# Patient Record
Sex: MALE | Race: WHITE | NOT HISPANIC OR LATINO | Employment: OTHER | URBAN - METROPOLITAN AREA
[De-identification: names, ages, dates, MRNs, and addresses within clinical notes are randomized per-mention and may not be internally consistent; named-entity substitution may affect disease eponyms.]

---

## 2023-03-28 LAB
BASOPHILS (10*3/UL) IN BLOOD BY AUTOMATED COUNT: 0.05 X10E9/L (ref 0–0.1)
BASOPHILS/100 LEUKOCYTES IN BLOOD BY AUTOMATED COUNT: 0.7 % (ref 0–2)
CALCIDIOL (25 OH VITAMIN D3) (NG/ML) IN SER/PLAS: 46 NG/ML
EOSINOPHILS (10*3/UL) IN BLOOD BY AUTOMATED COUNT: 0.53 X10E9/L (ref 0–0.4)
EOSINOPHILS/100 LEUKOCYTES IN BLOOD BY AUTOMATED COUNT: 7.4 % (ref 0–6)
ERYTHROCYTE DISTRIBUTION WIDTH (RATIO) BY AUTOMATED COUNT: 14.9 % (ref 11.5–14.5)
ERYTHROCYTE MEAN CORPUSCULAR HEMOGLOBIN CONCENTRATION (G/DL) BY AUTOMATED: 30.4 G/DL (ref 32–36)
ERYTHROCYTE MEAN CORPUSCULAR VOLUME (FL) BY AUTOMATED COUNT: 91 FL (ref 80–100)
ERYTHROCYTES (10*6/UL) IN BLOOD BY AUTOMATED COUNT: 4.28 X10E12/L (ref 4.5–5.9)
HEMATOCRIT (%) IN BLOOD BY AUTOMATED COUNT: 39.1 % (ref 41–52)
HEMOGLOBIN (G/DL) IN BLOOD: 11.9 G/DL (ref 13.5–17.5)
IMMATURE GRANULOCYTES/100 LEUKOCYTES IN BLOOD BY AUTOMATED COUNT: 0.3 % (ref 0–0.9)
LEUKOCYTES (10*3/UL) IN BLOOD BY AUTOMATED COUNT: 7.2 X10E9/L (ref 4.4–11.3)
LYMPHOCYTES (10*3/UL) IN BLOOD BY AUTOMATED COUNT: 1.26 X10E9/L (ref 0.8–3)
LYMPHOCYTES/100 LEUKOCYTES IN BLOOD BY AUTOMATED COUNT: 17.5 % (ref 13–44)
MONOCYTES (10*3/UL) IN BLOOD BY AUTOMATED COUNT: 0.62 X10E9/L (ref 0.05–0.8)
MONOCYTES/100 LEUKOCYTES IN BLOOD BY AUTOMATED COUNT: 8.6 % (ref 2–10)
NEUTROPHILS (10*3/UL) IN BLOOD BY AUTOMATED COUNT: 4.73 X10E9/L (ref 1.6–5.5)
NEUTROPHILS/100 LEUKOCYTES IN BLOOD BY AUTOMATED COUNT: 65.5 % (ref 40–80)
NRBC (PER 100 WBCS) BY AUTOMATED COUNT: 0 /100 WBC (ref 0–0)
PLATELETS (10*3/UL) IN BLOOD AUTOMATED COUNT: 145 X10E9/L (ref 150–450)

## 2023-05-09 ENCOUNTER — NURSING HOME VISIT (OUTPATIENT)
Dept: POST ACUTE CARE | Facility: EXTERNAL LOCATION | Age: 88
End: 2023-05-09
Payer: MEDICARE

## 2023-05-09 DIAGNOSIS — S32.591A PUBIC RAMUS FRACTURE, RIGHT, CLOSED, INITIAL ENCOUNTER (MULTI): Primary | ICD-10-CM

## 2023-05-09 DIAGNOSIS — J44.89 COPD WITH ASTHMA (MULTI): ICD-10-CM

## 2023-05-09 DIAGNOSIS — S22.43XA CLOSED FRACTURE OF MULTIPLE RIBS OF BOTH SIDES, INITIAL ENCOUNTER: ICD-10-CM

## 2023-05-09 PROCEDURE — 99310 SBSQ NF CARE HIGH MDM 45: CPT | Performed by: FAMILY MEDICINE

## 2023-05-09 PROCEDURE — G0317 PROLONG NURSING FAC EVAL 15M: HCPCS | Performed by: FAMILY MEDICINE

## 2023-05-09 NOTE — LETTER
Patient: Tomasz Menjivar  : 1/15/1934    Encounter Date: 2023    Nursing Home Visit  Name: Tomasz Menjivar  YOB: 1934  MRN: [id number]    Chief Complaint    Subjective  HPI:   72 y/o male Patient tripped over a grocery bag at home, falling on his R side. Imaging at [facility] ED notable for right pneumothorax as well as a posterior rib fx and pubic rami fx. Patient initially requiring O2 to 2L but resolved. Ortho c/s and recommended nonsurgical interventions and WBAT to the RLE. Patient admitted to [facility] where pain was well controlled on PO meds. Patient eating/drinking w/o issues. Patient pulling 1.5L on IS. PT/OT evaluated patient and recommended SNF. He was discharged in stable condition with appropriate f/u and scripts.   Review of Systems:  Reviewed chart looking at current medications, treatment, labs and x-rays and note pertinent positives or negatives, otherwise 10 point system review negative except for what is noted in HPI.    Objective  VS: 159/85, 98.0, 87, 18, 96%, 128.8#    Physical exam:   Physical Exam       Assessment/Plan   No problem-specific Assessment & Plan notes found for this encounter.       Nursing Home Visit  Name: Tomasz Menjivar  YOB: 1934  MRN: [id number]    Chief Complaint  Follow up on new admission  Subjective  HPI:   72 y/o Patient tripped over a grocery bag at home, falling on his R side. Imaging at [facility] ED notable for right pneumothorax as well as a posterior rib fx and pubic rami fx. Patient initially requiring O2 to 2L but resolved. Ortho c/s and recommended nonsurgical interventions and WBAT to the RLE. Patient admitted to [facility] where pain was well controlled on PO meds. Patient eating/drinking w/o issues. PT/OT evaluated patient and recommended SNF. He was discharged in stable condition with appropriate f/u and scripts.    Patient sitting in room.  Appetite good, denies pain or discomfort.    Review of Systems:  Reviewed chart looking at current  medications, treatment, labs and x-rays and note pertinent positives or negatives, otherwise 10 point system review negative except for what is noted in HPI.    Objective  VS: 159/85, 98.0, 87, 18, 96%, 129.8#    Physical exam:   Physical Exam  Vitals and nursing note reviewed.   Constitutional:       Appearance: Normal appearance. He is normal weight.   HENT:      Head: Normocephalic.      Nose: Nose normal.      Mouth/Throat:      Mouth: Mucous membranes are moist.   Eyes:      Extraocular Movements: Extraocular movements intact.      Conjunctiva/sclera: Conjunctivae normal.   Cardiovascular:      Rate and Rhythm: Normal rate and regular rhythm.      Pulses: Normal pulses.      Heart sounds: Normal heart sounds.   Pulmonary:      Effort: Pulmonary effort is normal.      Breath sounds: Normal breath sounds.   Abdominal:      General: Bowel sounds are normal.      Palpations: Abdomen is soft.   Musculoskeletal:         General: Normal range of motion.      Comments: Ecchymotic area right flank, painful to touch.     Skin:     General: Skin is warm and dry.   Neurological:      General: No focal deficit present.      Mental Status: He is alert and oriented to person, place, and time.   Psychiatric:         Mood and Affect: Mood normal.         Behavior: Behavior normal.         Thought Content: Thought content normal.         Judgment: Judgment normal.        Assessment/Plan   72 y/o Patient tripped over a grocery bag at home, falling on his R side. Imaging at [facility] ED notable for right pneumothorax as well as a posterior rib fx and pubic rami fx.  # Pubic Ramus fracture- WBAT follow up with Dr. Hernandez in 1-2 weeks.  . For PT/OT for mobility, gait training, endurance, safety awareness, ADLs, and assessment of post-rehab needs.   # Pain- Patient is refusing oxy and states he only wants Tylenol and lidocaine patch for pain. He says his pain is minimal  # COPD- c/w fluticasone, prn albuterol  # HTN- c/w losartan,  hydrochlorothiazide  # Insomnia- c/w melatonin  # Glaucoma- c/w Latanoprost, Brimonidine        Electronically Signed By: BRITANY Nunez-CNP   5/22/23  3:44 PM

## 2023-05-10 ENCOUNTER — NURSING HOME VISIT (OUTPATIENT)
Dept: POST ACUTE CARE | Facility: EXTERNAL LOCATION | Age: 88
End: 2023-05-10
Payer: MEDICARE

## 2023-05-10 DIAGNOSIS — S32.591A PUBIC RAMUS FRACTURE, RIGHT, CLOSED, INITIAL ENCOUNTER (MULTI): ICD-10-CM
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
## 2023-05-10 DIAGNOSIS — S22.43XA CLOSED FRACTURE OF MULTIPLE RIBS OF BOTH SIDES, INITIAL ENCOUNTER: ICD-10-CM

## 2023-05-10 DIAGNOSIS — J44.89 COPD WITH ASTHMA (MULTI): ICD-10-CM

## 2023-05-10 PROCEDURE — 99306 1ST NF CARE HIGH MDM 50: CPT | Performed by: FAMILY MEDICINE

## 2023-05-10 NOTE — LETTER
Patient: Tomasz Menjivar  : 1/15/1934    Encounter Date: 05/10/2023    Subjective  Patient ID: Tomasz Menjivar is a 89 y.o. male who presents for No chief complaint on file..      HPI  73 YOM transferred as trauma from OSH. Patient tripped over a grocery bag at   home, falling on his R side. Imaging at [facility] ED notable for right pneumothorax   as well as a posterior rib fx and pubic rami fx. Patient initially requiring O2   to 2L but resolved.  Ortho c/s and recommended nonsurgical interventions and   WBAT to the RLE.  Patient admitted to [facility] where pain was well controlled on PO   meds.  Patient eating/drinking w/o issues. Patient pulling 1.5L on IS. PT/OT   evaluated patient and recommended SNF.     Admitted to [facility] for rehab  C/o pain back of ribs.   No current outpatient medications on file prior to visit.     No current facility-administered medications on file prior to visit.        Review of Systems   Constitutional:  Positive for activity change, appetite change and fatigue.   HENT: Negative.     Respiratory:  Positive for shortness of breath.    Cardiovascular: Negative.    Gastrointestinal:  Positive for constipation.   Genitourinary: Negative.    Musculoskeletal:  Positive for arthralgias, back pain, gait problem and myalgias. Negative for joint swelling.   Hematological: Negative.    Psychiatric/Behavioral: Negative.             Objective  There were no vitals taken for this visit.  BSA: There is no height or weight on file to calculate BSA.  Growth percentiles: Facility age limit for growth %chad is 20 years. Facility age limit for growth %chad is 20 years.   No visits with results within 1 Week(s) from this visit.   Latest known visit with results is:   Orders Only on 2023   Component Date Value Ref Range Status   • WBC 2023 7.2  4.4 - 11.3 x10E9/L Final   • nRBC 2023 0.0  0.0 - 0.0 /100 WBC Final   • RBC 2023 4.28 (L)  4.50 - 5.90 x10E12/L Final   • Hemoglobin  03/28/2023 11.9 (L)  13.5 - 17.5 g/dL Final   • Hematocrit 03/28/2023 39.1 (L)  41.0 - 52.0 % Final   • MCV 03/28/2023 91  80 - 100 fL Final   • MCHC 03/28/2023 30.4 (L)  32.0 - 36.0 g/dL Final   • Platelets 03/28/2023 145 (L)  150 - 450 x10E9/L Final   • RDW 03/28/2023 14.9 (H)  11.5 - 14.5 % Final   • Neutrophils % 03/28/2023 65.5  40.0 - 80.0 % Final   • Immature Granulocytes %, Automated 03/28/2023 0.3  0.0 - 0.9 % Final    Comment:  Immature Granulocyte Count (IG) includes promyelocytes,    myelocytes and metamyelocytes but does not include bands.   Percent differential counts (%) should be interpreted in the   context of the absolute cell counts (cells/L).     • Lymphocytes % 03/28/2023 17.5  13.0 - 44.0 % Final   • Monocytes % 03/28/2023 8.6  2.0 - 10.0 % Final   • Eosinophils % 03/28/2023 7.4  0.0 - 6.0 % Final   • Basophils % 03/28/2023 0.7  0.0 - 2.0 % Final   • Neutrophils Absolute 03/28/2023 4.73  1.60 - 5.50 x10E9/L Final   • Lymphocytes Absolute 03/28/2023 1.26  0.80 - 3.00 x10E9/L Final   • Monocytes Absolute 03/28/2023 0.62  0.05 - 0.80 x10E9/L Final   • Eosinophils Absolute 03/28/2023 0.53 (H)  0.00 - 0.40 x10E9/L Final   • Basophils Absolute 03/28/2023 0.05  0.00 - 0.10 x10E9/L Final      Physical Exam  Constitutional:       Appearance: Normal appearance.   HENT:      Head: Normocephalic and atraumatic.      Nose: Nose normal.   Eyes:      Pupils: Pupils are equal, round, and reactive to light.   Cardiovascular:      Rate and Rhythm: Normal rate and regular rhythm.   Pulmonary:      Effort: Pulmonary effort is normal.      Breath sounds: Normal breath sounds.   Chest:      Chest wall: Tenderness present.   Abdominal:      General: Abdomen is flat.   Musculoskeletal:         General: Swelling and tenderness present.      Cervical back: Normal range of motion.   Neurological:      General: No focal deficit present.      Mental Status: He is alert and oriented to person, place, and time.    Psychiatric:         Mood and Affect: Mood normal.     Assessment and Plan:      Code Status:       Code Status Full Code             Assessment:      72YO M PMHx of HTN, HLD, allergies and asthma presented after fall in grocery      store parking lot             List of clinically significant injuries/problems:      - R hptx      - R rib fx's 5-8, (6-8 segmental)      - R pubic rami fx      - PMHx asthma, HTN, HLD             Plan/Recommendations:               ## R rib fx's       - Initially requiring O2; now on RA       - encourage frequent IS use - pulling 1500cc       - multimodal pain control with Robaxin, lidocaine patches, scheduled      acetaminophen, as needed oxy2.5/5             ## R pubic rami fx      Pain control          -> no acute surgical intervention           -> WBAT bilateral lower extremities           -> follow up outpt with Dr. Hernandez 1-2 weeks      - PT/OT: SNF             ## HTN      - Continue home Losartan, hctz,     # COPD   C/w Advair.                 Assessment/Plan  Problem List Items Addressed This Visit       Pubic ramus fracture, right, closed, initial encounter (CMS/[facility])          -> no acute surgical intervention           -> WBAT bilateral lower extremities           -> follow up outpt with Dr. Hernandez 1-2 weeks      - PT/OT: SNF                    Rib fracture    COPD with asthma (CMS/[facility])    Fall - Primary     PLAN:Reviewed orders, medications, records, and pertinent labs/x-rays from   hospital. Monitor VS, BS, O2, etc as per protocol. See written orders. PT/OT   will evaluate and start appropriate rehabilitation program. Reviewed and signed   off orders, medications,Labs, x-rays, and current diagnoses. Reviewed and   updated CPR status and any changes in Advanced directives. Continue Rehab Will   see 1-2 times weekly for next 30 days then reassess. Will always see at   resident, family , or nursing request. Discharge Planning   Time   Time Spent With Patient: 55 minutes  of which greater than 50 percent was spent   counseling and or coordinating care.             Electronically Signed By: James Krueger MD   5/14/23  1:52 PM

## 2023-05-11 ENCOUNTER — NURSING HOME VISIT (OUTPATIENT)
Dept: POST ACUTE CARE | Facility: EXTERNAL LOCATION | Age: 88
End: 2023-05-11

## 2023-05-11 ENCOUNTER — NURSING HOME VISIT (OUTPATIENT)
Dept: POST ACUTE CARE | Facility: EXTERNAL LOCATION | Age: 88
End: 2023-05-11
Payer: MEDICARE

## 2023-05-11 DIAGNOSIS — J44.89 COPD WITH ASTHMA (MULTI): ICD-10-CM

## 2023-05-11 DIAGNOSIS — S32.591A PUBIC RAMUS FRACTURE, RIGHT, CLOSED, INITIAL ENCOUNTER (MULTI): Primary | ICD-10-CM

## 2023-05-11 DIAGNOSIS — R52 PAIN: ICD-10-CM

## 2023-05-11 DIAGNOSIS — G47.09 OTHER INSOMNIA: ICD-10-CM

## 2023-05-11 DIAGNOSIS — H40.89 OTHER GLAUCOMA OF BOTH EYES: ICD-10-CM

## 2023-05-11 DIAGNOSIS — S22.43XA CLOSED FRACTURE OF MULTIPLE RIBS OF BOTH SIDES, INITIAL ENCOUNTER: ICD-10-CM

## 2023-05-11 DIAGNOSIS — I10 PRIMARY HYPERTENSION: ICD-10-CM

## 2023-05-11 PROCEDURE — 99308 SBSQ NF CARE LOW MDM 20: CPT | Performed by: FAMILY MEDICINE

## 2023-05-11 PROCEDURE — 99309 SBSQ NF CARE MODERATE MDM 30: CPT | Performed by: FAMILY MEDICINE

## 2023-05-11 NOTE — LETTER
Patient: Tomasz Menjivar  : 1/15/1934    Encounter Date: 2023    Nursing Home Visit  Name: Tomasz Menjivar  YOB: 1934  MRN: [id number]    Chief Complaint  Follow up on new admission  Subjective  HPI:   74 y/o Patient tripped over a grocery bag at home, falling on his R side. Imaging at [facility] ED notable for right pneumothorax as well as a posterior rib fx and pubic rami fx. Patient initially requiring O2 to 2L but resolved. Ortho c/s and recommended nonsurgical interventions and WBAT to the RLE. Patient admitted to [facility] where pain was well controlled on PO meds. Patient eating/drinking w/o issues. PT/OT evaluated patient and recommended SNF. He was discharged in stable condition with appropriate f/u and scripts.    Patient sitting in room.  Appetite good, denies pain or discomfort.      Patient in room.  Feels good, pain is minimal.  He states he believes he should be discharged soon  Review of Systems:  Reviewed chart looking at current medications, treatment, labs and x-rays and note pertinent positives or negatives, otherwise 10 point system review negative except for what is noted in HPI.    Objective  VS: 140/72, 97.8, 72, 18, 96%,     Physical exam:   Physical Exam  Vitals and nursing note reviewed.   Constitutional:       Appearance: Normal appearance. He is normal weight.   HENT:      Head: Normocephalic.      Nose: Nose normal.      Mouth/Throat:      Mouth: Mucous membranes are moist.   Eyes:      Extraocular Movements: Extraocular movements intact.      Conjunctiva/sclera: Conjunctivae normal.   Cardiovascular:      Rate and Rhythm: Normal rate and regular rhythm.      Pulses: Normal pulses.      Heart sounds: Normal heart sounds.   Pulmonary:      Effort: Pulmonary effort is normal.      Breath sounds: Normal breath sounds.   Abdominal:      General: Bowel sounds are normal.      Palpations: Abdomen is soft.   Musculoskeletal:         General: Normal range of motion.       Comments: Ecchymotic area right flank, less pain.  Ambulating with walker w/o issue   Skin:     General: Skin is warm and dry.   Neurological:      General: No focal deficit present.      Mental Status: He is alert and oriented to person, place, and time.   Psychiatric:         Mood and Affect: Mood normal.         Behavior: Behavior normal.         Thought Content: Thought content normal.         Judgment: Judgment normal.        Assessment/Plan   74 y/o Patient tripped over a grocery bag at home, falling on his R side. Imaging at [facility] ED notable for right pneumothorax as well as a posterior rib fx and pubic rami fx.    Pubic ramus fracture, right, closed, initial encounter (CMS/[facility])  C/w PT/OT for mobility, gait training, endurance, safety awareness, ADLs, and assessment of post-rehab needs.      Pain  C/w Lidocaine patch and prn Tylenol.  Is stable     COPD with asthma (CMS/[facility])  c/w fluticasone, prn albuterol     Primary hypertension  C/w losartan, hydrochlorothiazide     Other insomnia  C/w melatonin     Other specified glaucoma  c/w Latanoprost, Brimonidine     Rib fracture  C/w PT/OT for mobility, gait training, endurance, safety awareness, ADLs, and assessment of post-rehab needs.         Electronically Signed By: ZAIAR Nunez   6/29/23  4:39 PM

## 2023-05-11 NOTE — LETTER
Patient: Tomasz Menjivar  : 1/15/1934    Encounter Date: 2023    Nursing Home Visit  Name: Tomasz Menjivar  YOB: 1934  MRN: [id number]    Chief Complaint  Follow up on new admission  Subjective  HPI:   74 y/o Patient tripped over a grocery bag at home, falling on his R side. Imaging at [facility] ED notable for right pneumothorax as well as a posterior rib fx and pubic rami fx. Patient initially requiring O2 to 2L but resolved. Ortho c/s and recommended nonsurgical interventions and WBAT to the RLE. Patient admitted to [facility] where pain was well controlled on PO meds. Patient eating/drinking w/o issues. PT/OT evaluated patient and recommended SNF. He was discharged in stable condition with appropriate f/u and scripts.    Patient sitting in room.  Appetite good, denies pain or discomfort.      Patient in room.  Feels good, pain is minimal.  He states he believes he should be discharged soon  Review of Systems:  Reviewed chart looking at current medications, treatment, labs and x-rays and note pertinent positives or negatives, otherwise 10 point system review negative except for what is noted in HPI.    Objective  VS: 140/72, 97.8, 72, 18, 96%,     Physical exam:   Physical Exam  Vitals and nursing note reviewed.   Constitutional:       Appearance: Normal appearance. He is normal weight.   HENT:      Head: Normocephalic.      Nose: Nose normal.      Mouth/Throat:      Mouth: Mucous membranes are moist.   Eyes:      Extraocular Movements: Extraocular movements intact.      Conjunctiva/sclera: Conjunctivae normal.   Cardiovascular:      Rate and Rhythm: Normal rate and regular rhythm.      Pulses: Normal pulses.      Heart sounds: Normal heart sounds.   Pulmonary:      Effort: Pulmonary effort is normal.      Breath sounds: Normal breath sounds.   Abdominal:      General: Bowel sounds are normal.      Palpations: Abdomen is soft.   Musculoskeletal:         General: Normal range of motion.       Comments: Ecchymotic area right flank, less pain.  Ambulating with walker w/o issue   Skin:     General: Skin is warm and dry.   Neurological:      General: No focal deficit present.      Mental Status: He is alert and oriented to person, place, and time.   Psychiatric:         Mood and Affect: Mood normal.         Behavior: Behavior normal.         Thought Content: Thought content normal.         Judgment: Judgment normal.        Assessment/Plan   72 y/o Patient tripped over a grocery bag at home, falling on his R side. Imaging at [facility] ED notable for right pneumothorax as well as a posterior rib fx and pubic rami fx.  Pubic ramus fracture, right, closed, initial encounter (CMS/[facility])  C/w PT/OT for mobility, gait training, endurance, safety awareness, ADLs, and assessment of post-rehab needs.     Pain  C/w Lidocaine patch and prn Tylenol.  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        Electronically Signed By: ZAIRA Nunez   5/22/23  4:18 PM

## 2023-05-14 VITALS — SYSTOLIC BLOOD PRESSURE: 112 MMHG | HEART RATE: 80 BPM | DIASTOLIC BLOOD PRESSURE: 80 MMHG

## 2023-05-14 PROBLEM — S32.591A PUBIC RAMUS FRACTURE, RIGHT, CLOSED, INITIAL ENCOUNTER (MULTI): Status: ACTIVE | Noted: 2023-05-14

## 2023-05-14 PROBLEM — J44.89 COPD WITH ASTHMA (MULTI): Status: ACTIVE | Noted: 2023-05-14
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
## 2023-05-14 PROBLEM — S22.39XA RIB FRACTURE: Status: ACTIVE | Noted: 2023-05-14

## 2023-05-14 ASSESSMENT — ENCOUNTER SYMPTOMS
MYALGIAS: 1
FATIGUE: 1
SHORTNESS OF BREATH: 1
APPETITE CHANGE: 1
BACK PAIN: 1
HEMATOLOGIC/LYMPHATIC NEGATIVE: 1
PSYCHIATRIC NEGATIVE: 1
CARDIOVASCULAR NEGATIVE: 1
CONSTIPATION: 1
ARTHRALGIAS: 1
ACTIVITY CHANGE: 1
JOINT SWELLING: 0

## 2023-05-14 NOTE — PROGRESS NOTES
Subjective   Patient ID: Tomasz Menjivar is a 89 y.o. male who presents for No chief complaint on file..      HPI  73 YOM transferred as trauma from OSH. Patient tripped over a grocery bag at   home, falling on his R side. Imaging at [facility] ED notable for right pneumothorax   as well as a posterior rib fx and pubic rami fx. Patient initially requiring O2   to 2L but resolved.  Ortho c/s and recommended nonsurgical interventions and   WBAT to the RLE.  Patient admitted to [facility] where pain was well controlled on PO   meds.  Patient eating/drinking w/o issues. Patient pulling 1.5L on IS. PT/OT   evaluated patient and recommended SNF.     Admitted to [facility] for rehab  C/o pain back of ribs.   No current outpatient medications on file prior to visit.     No current facility-administered medications on file prior to visit.        Review of Systems   Constitutional:  Positive for activity change, appetite change and fatigue.   HENT: Negative.     Respiratory:  Positive for shortness of breath.    Cardiovascular: Negative.    Gastrointestinal:  Positive for constipation.   Genitourinary: Negative.    Musculoskeletal:  Positive for arthralgias, back pain, gait problem and myalgias. Negative for joint swelling.   Hematological: Negative.    Psychiatric/Behavioral: Negative.             Objective   There were no vitals taken for this visit.  BSA: There is no height or weight on file to calculate BSA.  Growth percentiles: Facility age limit for growth %chad is 20 years. Facility age limit for growth %chad is 20 years.   No visits with results within 1 Week(s) from this visit.   Latest known visit with results is:   Orders Only on 03/28/2023   Component Date Value Ref Range Status    WBC 03/28/2023 7.2  4.4 - 11.3 x10E9/L Final    nRBC 03/28/2023 0.0  0.0 - 0.0 /100 WBC Final    RBC 03/28/2023 4.28 (L)  4.50 - 5.90 x10E12/L Final    Hemoglobin 03/28/2023 11.9 (L)  13.5 - 17.5 g/dL Final    Hematocrit 03/28/2023 39.1 (L)  41.0  - 52.0 % Final    MCV 03/28/2023 91  80 - 100 fL Final    MCHC 03/28/2023 30.4 (L)  32.0 - 36.0 g/dL Final    Platelets 03/28/2023 145 (L)  150 - 450 x10E9/L Final    RDW 03/28/2023 14.9 (H)  11.5 - 14.5 % Final    Neutrophils % 03/28/2023 65.5  40.0 - 80.0 % Final    Immature Granulocytes %, Automated 03/28/2023 0.3  0.0 - 0.9 % Final    Comment:  Immature Granulocyte Count (IG) includes promyelocytes,    myelocytes and metamyelocytes but does not include bands.   Percent differential counts (%) should be interpreted in the   context of the absolute cell counts (cells/L).      Lymphocytes % 03/28/2023 17.5  13.0 - 44.0 % Final    Monocytes % 03/28/2023 8.6  2.0 - 10.0 % Final    Eosinophils % 03/28/2023 7.4  0.0 - 6.0 % Final    Basophils % 03/28/2023 0.7  0.0 - 2.0 % Final    Neutrophils Absolute 03/28/2023 4.73  1.60 - 5.50 x10E9/L Final    Lymphocytes Absolute 03/28/2023 1.26  0.80 - 3.00 x10E9/L Final    Monocytes Absolute 03/28/2023 0.62  0.05 - 0.80 x10E9/L Final    Eosinophils Absolute 03/28/2023 0.53 (H)  0.00 - 0.40 x10E9/L Final    Basophils Absolute 03/28/2023 0.05  0.00 - 0.10 x10E9/L Final      Physical Exam  Constitutional:       Appearance: Normal appearance.   HENT:      Head: Normocephalic and atraumatic.      Nose: Nose normal.   Eyes:      Pupils: Pupils are equal, round, and reactive to light.   Cardiovascular:      Rate and Rhythm: Normal rate and regular rhythm.   Pulmonary:      Effort: Pulmonary effort is normal.      Breath sounds: Normal breath sounds.   Chest:      Chest wall: Tenderness present.   Abdominal:      General: Abdomen is flat.   Musculoskeletal:         General: Swelling and tenderness present.      Cervical back: Normal range of motion.   Neurological:      General: No focal deficit present.      Mental Status: He is alert and oriented to person, place, and time.   Psychiatric:         Mood and Affect: Mood normal.     Assessment and Plan:      Code Status:       Code  Status Full Code             Assessment:      74YO M PMHx of HTN, HLD, allergies and asthma presented after fall in grocery      store parking lot             List of clinically significant injuries/problems:      - R hptx      - R rib fx's 5-8, (6-8 segmental)      - R pubic rami fx      - PMHx asthma, HTN, HLD             Plan/Recommendations:               ## R rib fx's       - Initially requiring O2; now on RA       - encourage frequent IS use - pulling 1500cc       - multimodal pain control with Robaxin, lidocaine patches, scheduled      acetaminophen, as needed oxy2.5/5             ## R pubic rami fx      Pain control          -> no acute surgical intervention           -> WBAT bilateral lower extremities           -> follow up outpt with Dr. Hernandez 1-2 weeks      - PT/OT: [facility]             ## HTN      - Continue home Losartan, hctz,     # COPD   C/w Advair.                 Assessment/Plan   Problem List Items Addressed This Visit       Pubic ramus fracture, right, closed, initial encounter (CMS/[facility])          -> no acute surgical intervention           -> WBAT bilateral lower extremities           -> follow up outpt with Dr. Hernandez 1-2 weeks      - PT/OT: [facility]                    Rib fracture    COPD with asthma (CMS/[facility])    Fall - Primary     PLAN:Reviewed orders, medications, records, and pertinent labs/x-rays from   hospital. Monitor VS, BS, O2, etc as per protocol. See written orders. PT/OT   will evaluate and start appropriate rehabilitation program. Reviewed and signed   off orders, medications,Labs, x-rays, and current diagnoses. Reviewed and   updated CPR status and any changes in Advanced directives. Continue Rehab Will   see 1-2 times weekly for next 30 days then reassess. Will always see at   resident, family , or nursing request. Discharge Planning   Time   Time Spent With Patient: 55 minutes of which greater than 50 percent was spent   counseling and or coordinating care.

## 2023-05-14 NOTE — ASSESSMENT & PLAN NOTE
-> no acute surgical intervention           -> WBAT bilateral lower extremities           -> follow up outpt with Dr. Hernandez 1-2 weeks      - PT/OT: SNF

## 2023-05-16 ENCOUNTER — NURSING HOME VISIT (OUTPATIENT)
Dept: POST ACUTE CARE | Facility: EXTERNAL LOCATION | Age: 88
End: 2023-05-16
Payer: MEDICARE

## 2023-05-16 DIAGNOSIS — R52 PAIN: ICD-10-CM

## 2023-05-16 DIAGNOSIS — H40.89 OTHER GLAUCOMA OF BOTH EYES: ICD-10-CM

## 2023-05-16 DIAGNOSIS — I10 PRIMARY HYPERTENSION: ICD-10-CM

## 2023-05-16 DIAGNOSIS — J44.89 COPD WITH ASTHMA (MULTI): ICD-10-CM

## 2023-05-16 DIAGNOSIS — G47.09 OTHER INSOMNIA: ICD-10-CM

## 2023-05-16 DIAGNOSIS — S32.591A PUBIC RAMUS FRACTURE, RIGHT, CLOSED, INITIAL ENCOUNTER (MULTI): Primary | ICD-10-CM

## 2023-05-16 DIAGNOSIS — S22.43XA CLOSED FRACTURE OF MULTIPLE RIBS OF BOTH SIDES, INITIAL ENCOUNTER: ICD-10-CM

## 2023-05-16 PROCEDURE — 99309 SBSQ NF CARE MODERATE MDM 30: CPT | Performed by: FAMILY MEDICINE

## 2023-05-16 NOTE — LETTER
Patient: Tomasz Menjivar  : 1/15/1934    Encounter Date: 2023    Nursing Home Visit  Name: Tomasz Menjivar  YOB: 1934  MRN: [id number]    Chief Complaint  Plan for discharge for patient  Subjective  HPI:   72 y/o Patient tripped over a grocery bag at home, falling on his R side. Imaging at [facility] ED notable for right pneumothorax as well as a posterior rib fx and pubic rami fx. Patient initially requiring O2 to 2L but resolved. Ortho c/s and recommended nonsurgical interventions and WBAT to the RLE. Patient admitted to [facility] where pain was well controlled on PO meds. Patient eating/drinking w/o issues. PT/OT evaluated patient and recommended SNF. He was discharged in stable condition with appropriate f/u and scripts.    Patient sitting in room.  Appetite good, denies pain or discomfort.      Patient in room.  Feels good, pain is minimal.  He states he believes he should be discharged soon    Patient in good spirits.  Appetite good, denies pain or discomfort. Denies HA, dizziness, SOB, CP, N&V or constipation    Review of Systems:  Reviewed chart looking at current medications, treatment, labs and x-rays and note pertinent positives or negatives, otherwise 10 point system review negative except for what is noted in HPI.    Objective  VS: 140/72, 97.8, 72, 18, 96%,     Physical exam:   Physical Exam  Vitals and nursing note reviewed.   Constitutional:       Appearance: Normal appearance. He is normal weight.   HENT:      Head: Normocephalic.      Nose: Nose normal.      Mouth/Throat:      Mouth: Mucous membranes are moist.   Eyes:      Extraocular Movements: Extraocular movements intact.      Conjunctiva/sclera: Conjunctivae normal.   Cardiovascular:      Rate and Rhythm: Normal rate and regular rhythm.      Pulses: Normal pulses.      Heart sounds: Normal heart sounds.   Pulmonary:      Effort: Pulmonary effort is normal.      Breath sounds: Normal breath sounds.   Abdominal:       General: Bowel sounds are normal.      Palpations: Abdomen is soft.   Musculoskeletal:         General: Normal range of motion.      Comments: Ecchymotic area right flank, less pain.  Ambulating with walker w/o issue   Skin:     General: Skin is warm and dry.   Neurological:      General: No focal deficit present.      Mental Status: He is alert and oriented to person, place, and time.   Psychiatric:         Mood and Affect: Mood normal.         Behavior: Behavior normal.         Thought Content: Thought content normal.         Judgment: Judgment normal.        Assessment/Plan   74 y/o Patient tripped over a grocery bag at home, falling on his R side. Imaging at [facility] ED notable for right pneumothorax as well as a posterior rib fx and pubic rami fx.    Other insomnia  C/w melatonin    Pain  C/w Lidocaine patch and prn Tylenol.  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     Rib fracture  C/w PT/OT for mobility, gait training, endurance, safety awareness, ADLs, and assessment of post-rehab needs.      Other specified glaucoma  c/w Latanoprost, Brimonidine    Primary hypertension  C/w losartan, hydrochlorothiazide      Client requires two wheeled walker d/t mobility limitation that significantly impairs his ability to participate in one or more mobility-related activities of daily living in the home including toileting, dressing, grooming, and bathing. The client is able to safely use the walker and the functional mobility deficit can be sufficiently resolved with use of walker.        Electronically Signed By: ZAIRA Nunez   5/23/23  1:28 PM

## 2023-05-18 ENCOUNTER — NURSING HOME VISIT (OUTPATIENT)
Dept: POST ACUTE CARE | Facility: EXTERNAL LOCATION | Age: 88
End: 2023-05-18
Payer: MEDICARE

## 2023-05-18 DIAGNOSIS — G47.09 OTHER INSOMNIA: ICD-10-CM

## 2023-05-18 DIAGNOSIS — S32.591S: Primary | ICD-10-CM

## 2023-05-18 DIAGNOSIS — I10 PRIMARY HYPERTENSION: ICD-10-CM

## 2023-05-18 DIAGNOSIS — R52 PAIN: ICD-10-CM

## 2023-05-18 DIAGNOSIS — J44.89 COPD WITH ASTHMA (MULTI): ICD-10-CM

## 2023-05-18 DIAGNOSIS — H40.89 OTHER GLAUCOMA OF BOTH EYES: ICD-10-CM

## 2023-05-18 DIAGNOSIS — S22.43XA CLOSED FRACTURE OF MULTIPLE RIBS OF BOTH SIDES, INITIAL ENCOUNTER: ICD-10-CM

## 2023-05-18 PROCEDURE — 99316 NF DSCHRG MGMT 30 MIN+: CPT | Performed by: FAMILY MEDICINE

## 2023-05-18 NOTE — LETTER
Patient: Tomasz Menjivar  : 1/15/1934    Encounter Date: 2023    Nursing Home Visit  Name: Tomasz Menjivar  YOB: 1934  MRN: [id number]    Chief Complaint  Plan for discharge for patient  Subjective  HPI:   72 y/o Patient tripped over a grocery bag at home, falling on his R side. Imaging at [facility] ED notable for right pneumothorax as well as a posterior rib fx and pubic rami fx. Patient initially requiring O2 to 2L but resolved. Ortho c/s and recommended nonsurgical interventions and WBAT to the RLE. Patient admitted to [facility] where pain was well controlled on PO meds. Patient eating/drinking w/o issues. PT/OT evaluated patient and recommended SNF. He was discharged in stable condition with appropriate f/u and scripts.    Patient sitting in room.  Appetite good, denies pain or discomfort.      Patient in room.  Feels good, pain is minimal.  He states he believes he should be discharged soon    Patient in good spirits.  Appetite good, denies pain or discomfort. Denies HA, dizziness, SOB, CP, N&V or constipation    Patient planned for discharge next week.  He is in good spirits, denies pain or discomfort    Review of Systems:  Reviewed chart looking at current medications, treatment, labs and x-rays and note pertinent positives or negatives, otherwise 10 point system review negative except for what is noted in HPI.    Objective  VS:      Physical exam:   Physical Exam  Vitals and nursing note reviewed.   Constitutional:       Appearance: Normal appearance. He is normal weight.   HENT:      Head: Normocephalic.      Nose: Nose normal.      Mouth/Throat:      Mouth: Mucous membranes are moist.   Eyes:      Extraocular Movements: Extraocular movements intact.      Conjunctiva/sclera: Conjunctivae normal.   Cardiovascular:      Rate and Rhythm: Normal rate and regular rhythm.      Pulses: Normal pulses.      Heart sounds: Normal heart sounds.   Pulmonary:      Effort: Pulmonary effort is  normal.      Breath sounds: Normal breath sounds.   Abdominal:      General: Bowel sounds are normal.      Palpations: Abdomen is soft.   Musculoskeletal:         General: Normal range of motion.      Comments: Ecchymotic area right flank, absorbing, minimal pain  Ambulating with walker w/o issue   Skin:     General: Skin is warm and dry.   Neurological:      General: No focal deficit present.      Mental Status: He is alert and oriented to person, place, and time.   Psychiatric:         Mood and Affect: Mood normal.         Behavior: Behavior normal.         Thought Content: Thought content normal.         Judgment: Judgment normal.        Assessment/Plan   72 y/o Patient tripped over a grocery bag at home, falling on his R side. Imaging at [facility] ED notable for right pneumothorax as well as a posterior rib fx and pubic rami fx.     Other insomnia  C/w melatonin     Pain  C/w Lidocaine patch and prn Tylenol.  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      Rib fracture  C/w PT/OT for mobility, gait training, endurance, safety awareness, ADLs, and assessment of post-rehab needs.       Other specified glaucoma  c/w Latanoprost, Brimonidine     Primary hypertension  C/w losartan, hydrochlorothiazide    Client requires two wheeled walker d/t mobility limitation that significantly impairs his ability to participate in one or more mobility-related activities of daily living in the home including toileting, dressing, grooming, and bathing. The client is able to safely use the walker and the functional mobility deficit can be sufficiently resolved with use of walker.    Discussed with patient, nsg, and .  Patient ready for home scripts written and given to patient      Electronically Signed By: Cintia GAN  BRITANY Strong-CNP   7/8/23 12:36 PM

## 2023-05-22 PROBLEM — H40.89 OTHER SPECIFIED GLAUCOMA: Status: ACTIVE | Noted: 2023-05-22

## 2023-05-22 PROBLEM — I10 PRIMARY HYPERTENSION: Status: ACTIVE | Noted: 2023-05-22

## 2023-05-22 PROBLEM — G47.09 OTHER INSOMNIA: Status: ACTIVE | Noted: 2023-05-22

## 2023-05-22 PROBLEM — R52 PAIN: Status: ACTIVE | Noted: 2023-05-22

## 2023-05-22 NOTE — PROGRESS NOTES
Nursing Home Visit  Name: Tomasz Menjivar  YOB: 1934  MRN: [id number]    Chief Complaint  Follow up on new admission  Subjective  HPI:   72 y/o Patient tripped over a grocery bag at home, falling on his R side. Imaging at [facility] ED notable for right pneumothorax as well as a posterior rib fx and pubic rami fx. Patient initially requiring O2 to 2L but resolved. Ortho c/s and recommended nonsurgical interventions and WBAT to the RLE. Patient admitted to [facility] where pain was well controlled on PO meds. Patient eating/drinking w/o issues. PT/OT evaluated patient and recommended SNF. He was discharged in stable condition with appropriate f/u and scripts.   5/9 Patient sitting in room.  Appetite good, denies pain or discomfort.    Review of Systems:  Reviewed chart looking at current medications, treatment, labs and x-rays and note pertinent positives or negatives, otherwise 10 point system review negative except for what is noted in HPI.    Objective  VS: 159/85, 98.0, 87, 18, 96%, 129.8#    Physical exam:   Physical Exam  Vitals and nursing note reviewed.   Constitutional:       Appearance: Normal appearance. He is normal weight.   HENT:      Head: Normocephalic.      Nose: Nose normal.      Mouth/Throat:      Mouth: Mucous membranes are moist.   Eyes:      Extraocular Movements: Extraocular movements intact.      Conjunctiva/sclera: Conjunctivae normal.   Cardiovascular:      Rate and Rhythm: Normal rate and regular rhythm.      Pulses: Normal pulses.      Heart sounds: Normal heart sounds.   Pulmonary:      Effort: Pulmonary effort is normal.      Breath sounds: Normal breath sounds.   Abdominal:      General: Bowel sounds are normal.      Palpations: Abdomen is soft.   Musculoskeletal:         General: Normal range of motion.      Comments: Ecchymotic area right flank, painful to touch.     Skin:     General: Skin is warm and dry.   Neurological:      General: No focal deficit present.      Mental Status:  He is alert and oriented to person, place, and time.   Psychiatric:         Mood and Affect: Mood normal.         Behavior: Behavior normal.         Thought Content: Thought content normal.         Judgment: Judgment normal.        Assessment/Plan   74 y/o Patient tripped over a grocery bag at home, falling on his R side. Imaging at [facility] ED notable for right pneumothorax as well as a posterior rib fx and pubic rami fx.  # Pubic Ramus fracture- WBAT follow up with Dr. Hernandez in 1-2 weeks.  . For PT/OT for mobility, gait training, endurance, safety awareness, ADLs, and assessment of post-rehab needs.   # Pain- Patient is refusing oxy and states he only wants Tylenol and lidocaine patch for pain. He says his pain is minimal  # COPD- c/w fluticasone, prn albuterol  # HTN- c/w losartan, hydrochlorothiazide  # Insomnia- c/w melatonin  # Glaucoma- c/w Latanoprost, Brimonidine

## 2023-05-22 NOTE — PROGRESS NOTES
Nursing Home Visit  Name: Tomasz Menjivar  YOB: 1934  MRN: [id number]    Chief Complaint  Follow up on new admission  Subjective  HPI:   72 y/o Patient tripped over a grocery bag at home, falling on his R side. Imaging at [facility] ED notable for right pneumothorax as well as a posterior rib fx and pubic rami fx. Patient initially requiring O2 to 2L but resolved. Ortho c/s and recommended nonsurgical interventions and WBAT to the RLE. Patient admitted to [facility] where pain was well controlled on PO meds. Patient eating/drinking w/o issues. PT/OT evaluated patient and recommended SNF. He was discharged in stable condition with appropriate f/u and scripts.   5/9 Patient sitting in room.  Appetite good, denies pain or discomfort.    5/11  Patient in room.  Feels good, pain is minimal.  He states he believes he should be discharged soon  Review of Systems:  Reviewed chart looking at current medications, treatment, labs and x-rays and note pertinent positives or negatives, otherwise 10 point system review negative except for what is noted in HPI.    Objective  VS: 140/72, 97.8, 72, 18, 96%,     Physical exam:   Physical Exam  Vitals and nursing note reviewed.   Constitutional:       Appearance: Normal appearance. He is normal weight.   HENT:      Head: Normocephalic.      Nose: Nose normal.      Mouth/Throat:      Mouth: Mucous membranes are moist.   Eyes:      Extraocular Movements: Extraocular movements intact.      Conjunctiva/sclera: Conjunctivae normal.   Cardiovascular:      Rate and Rhythm: Normal rate and regular rhythm.      Pulses: Normal pulses.      Heart sounds: Normal heart sounds.   Pulmonary:      Effort: Pulmonary effort is normal.      Breath sounds: Normal breath sounds.   Abdominal:      General: Bowel sounds are normal.      Palpations: Abdomen is soft.   Musculoskeletal:         General: Normal range of motion.      Comments: Ecchymotic area right flank, less pain.  Ambulating with walker w/o  issue   Skin:     General: Skin is warm and dry.   Neurological:      General: No focal deficit present.      Mental Status: He is alert and oriented to person, place, and time.   Psychiatric:         Mood and Affect: Mood normal.         Behavior: Behavior normal.         Thought Content: Thought content normal.         Judgment: Judgment normal.        Assessment/Plan   74 y/o Patient tripped over a grocery bag at home, falling on his R side. Imaging at [facility] ED notable for right pneumothorax as well as a posterior rib fx and pubic rami fx.  Pubic ramus fracture, right, closed, initial encounter (CMS/[facility])  C/w PT/OT for mobility, gait training, endurance, safety awareness, ADLs, and assessment of post-rehab needs.     Pain  C/w Lidocaine patch and prn Tylenol.  Is stable    COPD with asthma (CMS/[facility])  c/w fluticasone, prn albuterol    Primary hypertension  C/w losartan, hydrochlorothiazide    Other insomnia  C/w melatonin    Other specified glaucoma  c/w Latanoprost, Brimonidine    Rib fracture  C/w PT/OT for mobility, gait training, endurance, safety awareness, ADLs, and assessment of post-rehab needs.

## 2023-05-22 NOTE — ASSESSMENT & PLAN NOTE
C/w PT/OT for mobility, gait training, endurance, safety awareness, ADLs, and assessment of post-rehab needs.

## 2023-05-22 NOTE — PROGRESS NOTES
Nursing Home Visit  Name: Tomasz Menjivar  YOB: 1934  MRN: [id number]    Chief Complaint    Subjective  HPI:   74 y/o male Patient tripped over a grocery bag at home, falling on his R side. Imaging at [facility] ED notable for right pneumothorax as well as a posterior rib fx and pubic rami fx. Patient initially requiring O2 to 2L but resolved. Ortho c/s and recommended nonsurgical interventions and WBAT to the RLE. Patient admitted to [facility] where pain was well controlled on PO meds. Patient eating/drinking w/o issues. Patient pulling 1.5L on IS. PT/OT evaluated patient and recommended SNF. He was discharged in stable condition with appropriate f/u and scripts.   Review of Systems:  Reviewed chart looking at current medications, treatment, labs and x-rays and note pertinent positives or negatives, otherwise 10 point system review negative except for what is noted in HPI.    Objective  VS: 159/85, 98.0, 87, 18, 96%, 128.8#    Physical exam:   Physical Exam       Assessment/Plan   No problem-specific Assessment & Plan notes found for this encounter.

## 2023-05-23 VITALS
HEART RATE: 68 BPM | OXYGEN SATURATION: 97 % | WEIGHT: 129.8 LBS | TEMPERATURE: 97.9 F | SYSTOLIC BLOOD PRESSURE: 140 MMHG | RESPIRATION RATE: 18 BRPM | DIASTOLIC BLOOD PRESSURE: 81 MMHG

## 2023-05-23 RX ORDER — LOSARTAN POTASSIUM 100 MG/1
100 TABLET ORAL DAILY
COMMUNITY
Start: 2021-06-04

## 2023-05-23 RX ORDER — AZELASTINE 1 MG/ML
2 SPRAY, METERED NASAL 2 TIMES DAILY
COMMUNITY
Start: 2022-06-23

## 2023-05-23 RX ORDER — FLUTICASONE PROPIONATE 50 MCG
1 SPRAY, SUSPENSION (ML) NASAL DAILY
COMMUNITY
Start: 2013-10-11

## 2023-05-23 RX ORDER — ACETAMINOPHEN 325 MG/1
650 TABLET ORAL EVERY 6 HOURS PRN
Qty: 30 TABLET | Refills: 0
Start: 2023-05-23 | End: 2023-06-02

## 2023-05-23 RX ORDER — BRIMONIDINE TARTRATE 2 MG/ML
1 SOLUTION/ DROPS OPHTHALMIC 2 TIMES DAILY
COMMUNITY
End: 2023-11-01 | Stop reason: SDUPTHER

## 2023-05-23 RX ORDER — FLUTICASONE PROPIONATE AND SALMETEROL 100; 50 UG/1; UG/1
1 POWDER RESPIRATORY (INHALATION)
COMMUNITY
Start: 2013-10-11

## 2023-05-23 RX ORDER — MELATONIN 3 MG
3 CAPSULE ORAL NIGHTLY
Refills: 0
Start: 2023-05-16

## 2023-05-23 RX ORDER — LATANOPROST 50 UG/ML
1 SOLUTION/ DROPS OPHTHALMIC NIGHTLY
COMMUNITY
Start: 2013-10-11

## 2023-05-23 RX ORDER — ALBUTEROL SULFATE 90 UG/1
2 AEROSOL, METERED RESPIRATORY (INHALATION) EVERY 6 HOURS PRN
COMMUNITY
Start: 2022-06-23

## 2023-05-23 RX ORDER — HYDROCHLOROTHIAZIDE 25 MG/1
25 TABLET ORAL DAILY
COMMUNITY
Start: 2021-06-04

## 2023-05-23 RX ORDER — LIDOCAINE 50 MG/G
1 PATCH TOPICAL DAILY
Start: 2023-05-29

## 2023-05-23 NOTE — PROGRESS NOTES
Nursing Home Visit  Name: Tomasz Menjivar  YOB: 1934  MRN: [id number]    Chief Complaint  Plan for discharge for patient  Subjective  HPI:   74 y/o Patient tripped over a grocery bag at home, falling on his R side. Imaging at [facility] ED notable for right pneumothorax as well as a posterior rib fx and pubic rami fx. Patient initially requiring O2 to 2L but resolved. Ortho c/s and recommended nonsurgical interventions and WBAT to the RLE. Patient admitted to [facility] where pain was well controlled on PO meds. Patient eating/drinking w/o issues. PT/OT evaluated patient and recommended SNF. He was discharged in stable condition with appropriate f/u and scripts.   5/9 Patient sitting in room.  Appetite good, denies pain or discomfort.    5/11  Patient in room.  Feels good, pain is minimal.  He states he believes he should be discharged soon  5/16  Patient in good spirits.  Appetite good, denies pain or discomfort. Denies HA, dizziness, SOB, CP, N&V or constipation    Review of Systems:  Reviewed chart looking at current medications, treatment, labs and x-rays and note pertinent positives or negatives, otherwise 10 point system review negative except for what is noted in HPI.    Objective  VS: 140/72, 97.8, 72, 18, 96%,     Physical exam:   Physical Exam  Vitals and nursing note reviewed.   Constitutional:       Appearance: Normal appearance. He is normal weight.   HENT:      Head: Normocephalic.      Nose: Nose normal.      Mouth/Throat:      Mouth: Mucous membranes are moist.   Eyes:      Extraocular Movements: Extraocular movements intact.      Conjunctiva/sclera: Conjunctivae normal.   Cardiovascular:      Rate and Rhythm: Normal rate and regular rhythm.      Pulses: Normal pulses.      Heart sounds: Normal heart sounds.   Pulmonary:      Effort: Pulmonary effort is normal.      Breath sounds: Normal breath sounds.   Abdominal:      General: Bowel sounds are normal.      Palpations: Abdomen is soft.    Musculoskeletal:         General: Normal range of motion.      Comments: Ecchymotic area right flank, less pain.  Ambulating with walker w/o issue   Skin:     General: Skin is warm and dry.   Neurological:      General: No focal deficit present.      Mental Status: He is alert and oriented to person, place, and time.   Psychiatric:         Mood and Affect: Mood normal.         Behavior: Behavior normal.         Thought Content: Thought content normal.         Judgment: Judgment normal.        Assessment/Plan   72 y/o Patient tripped over a grocery bag at home, falling on his R side. Imaging at [facility] ED notable for right pneumothorax as well as a posterior rib fx and pubic rami fx.    Other insomnia  C/w melatonin    Pain  C/w Lidocaine patch and prn Tylenol.  Is stable    COPD with asthma (CMS/[facility])  c/w fluticasone, prn albuterol    Pubic ramus fracture, right, closed, initial encounter (CMS/[facility])  C/w PT/OT for mobility, gait training, endurance, safety awareness, ADLs, and assessment of post-rehab needs.     Rib fracture  C/w PT/OT for mobility, gait training, endurance, safety awareness, ADLs, and assessment of post-rehab needs.      Other specified glaucoma  c/w Latanoprost, Brimonidine    Primary hypertension  C/w losartan, hydrochlorothiazide      Client requires two wheeled walker d/t mobility limitation that significantly impairs his ability to participate in one or more mobility-related activities of daily living in the home including toileting, dressing, grooming, and bathing. The client is able to safely use the walker and the functional mobility deficit can be sufficiently resolved with use of walker.

## 2023-06-29 NOTE — PROGRESS NOTES
Nursing Home Visit  Name: Tomasz Menjivar  YOB: 1934  MRN: [id number]    Chief Complaint  Follow up on new admission  Subjective  HPI:   74 y/o Patient tripped over a grocery bag at home, falling on his R side. Imaging at [facility] ED notable for right pneumothorax as well as a posterior rib fx and pubic rami fx. Patient initially requiring O2 to 2L but resolved. Ortho c/s and recommended nonsurgical interventions and WBAT to the RLE. Patient admitted to [facility] where pain was well controlled on PO meds. Patient eating/drinking w/o issues. PT/OT evaluated patient and recommended SNF. He was discharged in stable condition with appropriate f/u and scripts.   5/9 Patient sitting in room.  Appetite good, denies pain or discomfort.    5/11  Patient in room.  Feels good, pain is minimal.  He states he believes he should be discharged soon  Review of Systems:  Reviewed chart looking at current medications, treatment, labs and x-rays and note pertinent positives or negatives, otherwise 10 point system review negative except for what is noted in HPI.    Objective  VS: 140/72, 97.8, 72, 18, 96%,     Physical exam:   Physical Exam  Vitals and nursing note reviewed.   Constitutional:       Appearance: Normal appearance. He is normal weight.   HENT:      Head: Normocephalic.      Nose: Nose normal.      Mouth/Throat:      Mouth: Mucous membranes are moist.   Eyes:      Extraocular Movements: Extraocular movements intact.      Conjunctiva/sclera: Conjunctivae normal.   Cardiovascular:      Rate and Rhythm: Normal rate and regular rhythm.      Pulses: Normal pulses.      Heart sounds: Normal heart sounds.   Pulmonary:      Effort: Pulmonary effort is normal.      Breath sounds: Normal breath sounds.   Abdominal:      General: Bowel sounds are normal.      Palpations: Abdomen is soft.   Musculoskeletal:         General: Normal range of motion.      Comments: Ecchymotic area right flank, less pain.  Ambulating with walker w/o  issue   Skin:     General: Skin is warm and dry.   Neurological:      General: No focal deficit present.      Mental Status: He is alert and oriented to person, place, and time.   Psychiatric:         Mood and Affect: Mood normal.         Behavior: Behavior normal.         Thought Content: Thought content normal.         Judgment: Judgment normal.        Assessment/Plan   72 y/o Patient tripped over a grocery bag at home, falling on his R side. Imaging at [facility] ED notable for right pneumothorax as well as a posterior rib fx and pubic rami fx.    Pubic ramus fracture, right, closed, initial encounter (CMS/[facility])  C/w PT/OT for mobility, gait training, endurance, safety awareness, ADLs, and assessment of post-rehab needs.      Pain  C/w Lidocaine patch and prn Tylenol.  Is stable     COPD with asthma (CMS/[facility])  c/w fluticasone, prn albuterol     Primary hypertension  C/w losartan, hydrochlorothiazide     Other insomnia  C/w melatonin     Other specified glaucoma  c/w Latanoprost, Brimonidine     Rib fracture  C/w PT/OT for mobility, gait training, endurance, safety awareness, ADLs, and assessment of post-rehab needs.

## 2023-07-08 VITALS
HEART RATE: 72 BPM | OXYGEN SATURATION: 96 % | RESPIRATION RATE: 18 BRPM | DIASTOLIC BLOOD PRESSURE: 59 MMHG | SYSTOLIC BLOOD PRESSURE: 145 MMHG | TEMPERATURE: 98 F | WEIGHT: 131 LBS

## 2023-07-08 PROBLEM — S32.591S: Status: ACTIVE | Noted: 2023-05-14

## 2023-07-08 PROBLEM — S32.592S: Status: RESOLVED | Noted: 2023-07-08 | Resolved: 2023-07-08

## 2023-07-08 PROBLEM — S32.592S: Status: ACTIVE | Noted: 2023-07-08

## 2023-07-08 NOTE — PROGRESS NOTES
Nursing Home Visit  Name: Tomasz Menjivar  YOB: 1934  MRN: [id number]    Chief Complaint  Plan for discharge for patient  Subjective  HPI:   74 y/o Patient tripped over a grocery bag at home, falling on his R side. Imaging at [facility] ED notable for right pneumothorax as well as a posterior rib fx and pubic rami fx. Patient initially requiring O2 to 2L but resolved. Ortho c/s and recommended nonsurgical interventions and WBAT to the RLE. Patient admitted to [facility] where pain was well controlled on PO meds. Patient eating/drinking w/o issues. PT/OT evaluated patient and recommended SNF. He was discharged in stable condition with appropriate f/u and scripts.   5/9 Patient sitting in room.  Appetite good, denies pain or discomfort.    5/11  Patient in room.  Feels good, pain is minimal.  He states he believes he should be discharged soon  5/16  Patient in good spirits.  Appetite good, denies pain or discomfort. Denies HA, dizziness, SOB, CP, N&V or constipation  5/18  Patient planned for discharge next week.  He is in good spirits, denies pain or discomfort    Review of Systems:  Reviewed chart looking at current medications, treatment, labs and x-rays and note pertinent positives or negatives, otherwise 10 point system review negative except for what is noted in HPI.    Objective  VS:      Physical exam:   Physical Exam  Vitals and nursing note reviewed.   Constitutional:       Appearance: Normal appearance. He is normal weight.   HENT:      Head: Normocephalic.      Nose: Nose normal.      Mouth/Throat:      Mouth: Mucous membranes are moist.   Eyes:      Extraocular Movements: Extraocular movements intact.      Conjunctiva/sclera: Conjunctivae normal.   Cardiovascular:      Rate and Rhythm: Normal rate and regular rhythm.      Pulses: Normal pulses.      Heart sounds: Normal heart sounds.   Pulmonary:      Effort: Pulmonary effort is normal.      Breath sounds: Normal breath sounds.   Abdominal:       General: Bowel sounds are normal.      Palpations: Abdomen is soft.   Musculoskeletal:         General: Normal range of motion.      Comments: Ecchymotic area right flank, absorbing, minimal pain  Ambulating with walker w/o issue   Skin:     General: Skin is warm and dry.   Neurological:      General: No focal deficit present.      Mental Status: He is alert and oriented to person, place, and time.   Psychiatric:         Mood and Affect: Mood normal.         Behavior: Behavior normal.         Thought Content: Thought content normal.         Judgment: Judgment normal.        Assessment/Plan   74 y/o Patient tripped over a grocery bag at home, falling on his R side. Imaging at [facility] ED notable for right pneumothorax as well as a posterior rib fx and pubic rami fx.     Other insomnia  C/w melatonin     Pain  C/w Lidocaine patch and prn Tylenol.  Is stable     COPD with asthma (CMS/[facility])  c/w fluticasone, prn albuterol     Pubic ramus fracture, right, closed, initial encounter (CMS/[facility])  C/w PT/OT for mobility, gait training, endurance, safety awareness, ADLs, and assessment of post-rehab needs.      Rib fracture  C/w PT/OT for mobility, gait training, endurance, safety awareness, ADLs, and assessment of post-rehab needs.       Other specified glaucoma  c/w Latanoprost, Brimonidine     Primary hypertension  C/w losartan, hydrochlorothiazide    Client requires two wheeled walker d/t mobility limitation that significantly impairs his ability to participate in one or more mobility-related activities of daily living in the home including toileting, dressing, grooming, and bathing. The client is able to safely use the walker and the functional mobility deficit can be sufficiently resolved with use of walker.    Discussed with patient, nsg, and .  Patient ready for home scripts written and given to patient

## 2023-10-31 DIAGNOSIS — H40.1132 PRIMARY OPEN ANGLE GLAUCOMA OF BOTH EYES, MODERATE STAGE: Primary | ICD-10-CM

## 2023-11-01 DIAGNOSIS — H40.9 GLAUCOMA OF BOTH EYES, UNSPECIFIED GLAUCOMA TYPE: Primary | ICD-10-CM

## 2023-11-01 RX ORDER — BRIMONIDINE TARTRATE 2 MG/ML
SOLUTION/ DROPS OPHTHALMIC
Qty: 10 ML | Refills: 0 | Status: SHIPPED | OUTPATIENT
Start: 2023-11-01 | End: 2024-04-20

## 2023-11-01 RX ORDER — BRIMONIDINE TARTRATE 2 MG/ML
1 SOLUTION/ DROPS OPHTHALMIC 2 TIMES DAILY
Qty: 3 ML | Refills: 11 | Status: SHIPPED | OUTPATIENT
Start: 2023-11-01 | End: 2023-12-07 | Stop reason: SDUPTHER

## 2023-11-16 ENCOUNTER — OFFICE VISIT (OUTPATIENT)
Dept: OPHTHALMOLOGY | Facility: CLINIC | Age: 88
End: 2023-11-16
Payer: MEDICARE

## 2023-11-16 DIAGNOSIS — H25.011 CORTICAL AGE-RELATED CATARACT OF RIGHT EYE: ICD-10-CM

## 2023-11-16 DIAGNOSIS — H40.1111 PRIMARY OPEN ANGLE GLAUCOMA OF RIGHT EYE, MILD STAGE: ICD-10-CM

## 2023-11-16 DIAGNOSIS — H26.9 CATARACT OF BOTH EYES, UNSPECIFIED CATARACT TYPE: Primary | ICD-10-CM

## 2023-11-16 LAB
A LENGTH (OD): 24.65
A LENGTH (OS): 24.28

## 2023-11-16 PROCEDURE — 99214 OFFICE O/P EST MOD 30 MIN: CPT | Performed by: OPHTHALMOLOGY

## 2023-11-16 PROCEDURE — 92136 OPHTHALMIC BIOMETRY: CPT | Performed by: OPHTHALMOLOGY

## 2023-11-16 PROCEDURE — 1159F MED LIST DOCD IN RCRD: CPT | Performed by: OPHTHALMOLOGY

## 2023-11-16 PROCEDURE — 92136 OPHTHALMIC BIOMETRY: CPT | Mod: BILATERAL PROCEDURE | Performed by: OPHTHALMOLOGY

## 2023-11-16 PROCEDURE — 1036F TOBACCO NON-USER: CPT | Performed by: OPHTHALMOLOGY

## 2023-11-16 RX ORDER — TROPICAMIDE 10 MG/ML
1 SOLUTION/ DROPS OPHTHALMIC
Status: CANCELLED | OUTPATIENT
Start: 2023-11-16 | End: 2023-11-16

## 2023-11-16 RX ORDER — CYCLOPENTOLATE HYDROCHLORIDE 10 MG/ML
1 SOLUTION/ DROPS OPHTHALMIC
Status: CANCELLED | OUTPATIENT
Start: 2023-11-16 | End: 2023-11-16

## 2023-11-16 RX ORDER — FLUOROURACIL 50 MG/G
CREAM TOPICAL
COMMUNITY
Start: 2023-10-11

## 2023-11-16 RX ORDER — SODIUM CHLORIDE 9 MG/ML
100 INJECTION, SOLUTION INTRAVENOUS CONTINUOUS
Status: CANCELLED | OUTPATIENT
Start: 2023-11-16

## 2023-11-16 RX ORDER — MOXIFLOXACIN 5 MG/ML
1 SOLUTION/ DROPS OPHTHALMIC
Status: CANCELLED | OUTPATIENT
Start: 2023-11-16 | End: 2023-11-16

## 2023-11-16 RX ORDER — OMEPRAZOLE 20 MG/1
20 TABLET, DELAYED RELEASE ORAL
COMMUNITY

## 2023-11-16 RX ORDER — PROPARACAINE HYDROCHLORIDE 5 MG/ML
1 SOLUTION/ DROPS OPHTHALMIC ONCE
Status: CANCELLED | OUTPATIENT
Start: 2023-11-16 | End: 2023-11-16

## 2023-11-16 RX ORDER — DICLOFENAC SODIUM 1 MG/ML
1 SOLUTION/ DROPS OPHTHALMIC ONCE
Status: CANCELLED | OUTPATIENT
Start: 2023-11-16 | End: 2023-11-16

## 2023-11-16 RX ORDER — PHENYLEPHRINE HYDROCHLORIDE 25 MG/ML
1 SOLUTION/ DROPS OPHTHALMIC
Status: CANCELLED | OUTPATIENT
Start: 2023-11-16 | End: 2023-11-16

## 2023-11-16 RX ORDER — EPINEPHRINE 0.3 MG/.3ML
0.3 INJECTION SUBCUTANEOUS
COMMUNITY
Start: 2023-05-04

## 2023-11-16 ASSESSMENT — CUP TO DISC RATIO
OD_RATIO: 0.7
OS_RATIO: 0.7

## 2023-11-16 ASSESSMENT — ENCOUNTER SYMPTOMS
NEUROLOGICAL NEGATIVE: 0
RESPIRATORY NEGATIVE: 0
CARDIOVASCULAR NEGATIVE: 0
CONSTITUTIONAL NEGATIVE: 0
HEMATOLOGIC/LYMPHATIC NEGATIVE: 0
MUSCULOSKELETAL NEGATIVE: 0
ENDOCRINE NEGATIVE: 0
GASTROINTESTINAL NEGATIVE: 0
EYES NEGATIVE: 0
PSYCHIATRIC NEGATIVE: 0
ALLERGIC/IMMUNOLOGIC NEGATIVE: 0

## 2023-11-16 ASSESSMENT — PACHYMETRY
OD_CT(UM): 525
OS_CT(UM): 520

## 2023-11-16 ASSESSMENT — CONF VISUAL FIELD
OS_NORMAL: 1
OS_SUPERIOR_NASAL_RESTRICTION: 0
OS_INFERIOR_NASAL_RESTRICTION: 0
OS_INFERIOR_TEMPORAL_RESTRICTION: 0
OD_SUPERIOR_TEMPORAL_RESTRICTION: 0
OD_SUPERIOR_NASAL_RESTRICTION: 0
OD_INFERIOR_NASAL_RESTRICTION: 0
OD_NORMAL: 1
OS_SUPERIOR_TEMPORAL_RESTRICTION: 0
OD_INFERIOR_TEMPORAL_RESTRICTION: 0

## 2023-11-16 ASSESSMENT — EXTERNAL EXAM - RIGHT EYE: OD_EXAM: NORMAL

## 2023-11-16 ASSESSMENT — VISUAL ACUITY
CORRECTION_TYPE: GLASSES
METHOD: SNELLEN - LINEAR
OS_CC: 20/60
OD_CC: 20/40

## 2023-11-16 ASSESSMENT — SLIT LAMP EXAM - LIDS
COMMENTS: NORMAL
COMMENTS: NORMAL

## 2023-11-16 ASSESSMENT — TONOMETRY
OD_IOP_MMHG: 14
IOP_METHOD: GOLDMANN APPLANATION
OS_IOP_MMHG: 18

## 2023-11-16 ASSESSMENT — EXTERNAL EXAM - LEFT EYE: OS_EXAM: NORMAL

## 2023-11-17 ENCOUNTER — APPOINTMENT (OUTPATIENT)
Dept: RADIOLOGY | Facility: HOSPITAL | Age: 88
End: 2023-11-17
Payer: MEDICARE

## 2023-11-17 ENCOUNTER — HOSPITAL ENCOUNTER (EMERGENCY)
Facility: HOSPITAL | Age: 88
Discharge: HOME | End: 2023-11-17
Attending: EMERGENCY MEDICINE
Payer: MEDICARE

## 2023-11-17 VITALS
SYSTOLIC BLOOD PRESSURE: 132 MMHG | WEIGHT: 120 LBS | OXYGEN SATURATION: 99 % | RESPIRATION RATE: 16 BRPM | TEMPERATURE: 97.6 F | DIASTOLIC BLOOD PRESSURE: 78 MMHG | HEIGHT: 64 IN | HEART RATE: 78 BPM | BODY MASS INDEX: 20.49 KG/M2

## 2023-11-17 DIAGNOSIS — M79.641 PAIN OF RIGHT HAND: ICD-10-CM
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
## 2023-11-17 PROCEDURE — 2500000001 HC RX 250 WO HCPCS SELF ADMINISTERED DRUGS (ALT 637 FOR MEDICARE OP): Performed by: EMERGENCY MEDICINE

## 2023-11-17 PROCEDURE — 73130 X-RAY EXAM OF HAND: CPT | Mod: RIGHT SIDE | Performed by: RADIOLOGY

## 2023-11-17 PROCEDURE — 71046 X-RAY EXAM CHEST 2 VIEWS: CPT

## 2023-11-17 PROCEDURE — 71046 X-RAY EXAM CHEST 2 VIEWS: CPT | Performed by: RADIOLOGY

## 2023-11-17 PROCEDURE — 99284 EMERGENCY DEPT VISIT MOD MDM: CPT | Mod: 25

## 2023-11-17 PROCEDURE — 73130 X-RAY EXAM OF HAND: CPT | Mod: RT

## 2023-11-17 PROCEDURE — 99285 EMERGENCY DEPT VISIT HI MDM: CPT | Mod: 25 | Performed by: EMERGENCY MEDICINE

## 2023-11-17 RX ORDER — BACITRACIN ZINC 500 UNIT/G
1 OINTMENT IN PACKET (EA) TOPICAL ONCE
Status: COMPLETED | OUTPATIENT
Start: 2023-11-17 | End: 2023-11-17

## 2023-11-17 RX ADMIN — BACITRACIN ZINC 1 APPLICATION: 500 OINTMENT TOPICAL at 16:45

## 2023-11-17 ASSESSMENT — COLUMBIA-SUICIDE SEVERITY RATING SCALE - C-SSRS
2. HAVE YOU ACTUALLY HAD ANY THOUGHTS OF KILLING YOURSELF?: NO
1. IN THE PAST MONTH, HAVE YOU WISHED YOU WERE DEAD OR WISHED YOU COULD GO TO SLEEP AND NOT WAKE UP?: NO
6. HAVE YOU EVER DONE ANYTHING, STARTED TO DO ANYTHING, OR PREPARED TO DO ANYTHING TO END YOUR LIFE?: NO

## 2023-11-17 ASSESSMENT — PAIN SCALES - GENERAL: PAINLEVEL_OUTOF10: 2

## 2023-11-17 ASSESSMENT — PAIN - FUNCTIONAL ASSESSMENT: PAIN_FUNCTIONAL_ASSESSMENT: 0-10

## 2023-11-17 NOTE — DISCHARGE INSTRUCTIONS
Use bacitracin or Neosporin to prevent infection of your hand wound.  Keep it covered and clean.  Follow-up with primary care provider as needed.
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
## 2023-12-05 ENCOUNTER — LAB (OUTPATIENT)
Dept: LAB | Facility: LAB | Age: 88
End: 2023-12-05
Payer: MEDICARE

## 2023-12-05 ENCOUNTER — ANCILLARY PROCEDURE (OUTPATIENT)
Dept: RADIOLOGY | Facility: CLINIC | Age: 88
End: 2023-12-05
Payer: MEDICARE
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
## 2023-12-05 DIAGNOSIS — S80.10XD: ICD-10-CM

## 2023-12-05 DIAGNOSIS — S32.591S: ICD-10-CM

## 2023-12-05 PROCEDURE — 73590 X-RAY EXAM OF LOWER LEG: CPT | Mod: RIGHT SIDE | Performed by: STUDENT IN AN ORGANIZED HEALTH CARE EDUCATION/TRAINING PROGRAM

## 2023-12-05 PROCEDURE — 73590 X-RAY EXAM OF LOWER LEG: CPT | Mod: RT

## 2023-12-05 PROCEDURE — 80053 COMPREHEN METABOLIC PANEL: CPT

## 2023-12-05 PROCEDURE — 85027 COMPLETE CBC AUTOMATED: CPT

## 2023-12-06 LAB
ALBUMIN SERPL BCP-MCNC: 3.9 G/DL (ref 3.4–5)
ALP SERPL-CCNC: 95 U/L (ref 33–136)
ALT SERPL W P-5'-P-CCNC: 12 U/L (ref 10–52)
ANION GAP SERPL CALC-SCNC: 14 MMOL/L (ref 10–20)
AST SERPL W P-5'-P-CCNC: 14 U/L (ref 9–39)
BILIRUB SERPL-MCNC: 0.4 MG/DL (ref 0–1.2)
BUN SERPL-MCNC: 28 MG/DL (ref 6–23)
CALCIUM SERPL-MCNC: 9.2 MG/DL (ref 8.6–10.6)
CHLORIDE SERPL-SCNC: 102 MMOL/L (ref 98–107)
CO2 SERPL-SCNC: 26 MMOL/L (ref 21–32)
CREAT SERPL-MCNC: 1.03 MG/DL (ref 0.5–1.3)
ERYTHROCYTE [DISTWIDTH] IN BLOOD BY AUTOMATED COUNT: 14.8 % (ref 11.5–14.5)
GFR SERPL CREATININE-BSD FRML MDRD: 69 ML/MIN/1.73M*2
GLUCOSE SERPL-MCNC: 56 MG/DL (ref 74–99)
HCT VFR BLD AUTO: 35 % (ref 41–52)
HGB BLD-MCNC: 11.2 G/DL (ref 13.5–17.5)
MCH RBC QN AUTO: 28.4 PG (ref 26–34)
MCHC RBC AUTO-ENTMCNC: 32 G/DL (ref 32–36)
MCV RBC AUTO: 89 FL (ref 80–100)
NRBC BLD-RTO: 0 /100 WBCS (ref 0–0)
PLATELET # BLD AUTO: 179 X10*3/UL (ref 150–450)
POTASSIUM SERPL-SCNC: 4.5 MMOL/L (ref 3.5–5.3)
PROT SERPL-MCNC: 6.1 G/DL (ref 6.4–8.2)
RBC # BLD AUTO: 3.94 X10*6/UL (ref 4.5–5.9)
SODIUM SERPL-SCNC: 137 MMOL/L (ref 136–145)
WBC # BLD AUTO: 7.5 X10*3/UL (ref 4.4–11.3)

## 2023-12-10 RX ORDER — OFLOXACIN 3 MG/ML
1 SOLUTION/ DROPS OPHTHALMIC 4 TIMES DAILY
Qty: 5 ML | Refills: 0 | Status: SHIPPED | OUTPATIENT
Start: 2023-12-10 | End: 2024-01-09

## 2023-12-10 RX ORDER — DICLOFENAC SODIUM 1 MG/ML
1 SOLUTION/ DROPS OPHTHALMIC 4 TIMES DAILY
Qty: 2.5 ML | Refills: 1 | Status: SHIPPED | OUTPATIENT
Start: 2023-12-10 | End: 2024-01-09

## 2023-12-10 RX ORDER — PREDNISOLONE ACETATE 10 MG/ML
1 SUSPENSION/ DROPS OPHTHALMIC 4 TIMES DAILY
Qty: 5 ML | Refills: 0 | Status: SHIPPED | OUTPATIENT
Start: 2023-12-10 | End: 2024-01-09

## 2023-12-11 ENCOUNTER — ANESTHESIA EVENT (OUTPATIENT)
Dept: OPERATING ROOM | Facility: CLINIC | Age: 88
End: 2023-12-11
Payer: MEDICARE

## 2023-12-11 PROCEDURE — 65820 GONIOTOMY: CPT

## 2023-12-11 PROCEDURE — 66984 XCAPSL CTRC RMVL W/O ECP: CPT

## 2023-12-11 RX ORDER — FENTANYL CITRATE 50 UG/ML
25 INJECTION, SOLUTION INTRAMUSCULAR; INTRAVENOUS EVERY 5 MIN PRN
Status: CANCELLED | OUTPATIENT
Start: 2023-12-11

## 2023-12-11 RX ORDER — ONDANSETRON HYDROCHLORIDE 2 MG/ML
4 INJECTION, SOLUTION INTRAVENOUS ONCE AS NEEDED
Status: CANCELLED | OUTPATIENT
Start: 2023-12-11

## 2023-12-11 RX ORDER — FENTANYL CITRATE 50 UG/ML
50 INJECTION, SOLUTION INTRAMUSCULAR; INTRAVENOUS EVERY 5 MIN PRN
Status: CANCELLED | OUTPATIENT
Start: 2023-12-11

## 2023-12-11 RX ORDER — SODIUM CHLORIDE, SODIUM LACTATE, POTASSIUM CHLORIDE, CALCIUM CHLORIDE 600; 310; 30; 20 MG/100ML; MG/100ML; MG/100ML; MG/100ML
100 INJECTION, SOLUTION INTRAVENOUS CONTINUOUS
Status: CANCELLED | OUTPATIENT
Start: 2023-12-11

## 2023-12-11 RX ORDER — HYDRALAZINE HYDROCHLORIDE 20 MG/ML
5 INJECTION INTRAMUSCULAR; INTRAVENOUS EVERY 30 MIN PRN
Status: CANCELLED | OUTPATIENT
Start: 2023-12-11

## 2023-12-11 RX ORDER — ACETAMINOPHEN 325 MG/1
650 TABLET ORAL EVERY 4 HOURS PRN
Status: CANCELLED | OUTPATIENT
Start: 2023-12-11

## 2023-12-11 RX ORDER — ALBUTEROL SULFATE 0.83 MG/ML
2.5 SOLUTION RESPIRATORY (INHALATION) ONCE AS NEEDED
Status: CANCELLED | OUTPATIENT
Start: 2023-12-11

## 2023-12-11 NOTE — H&P
History Of Present Illness  Tomasz Menjivar is a 89 y.o. male with a history of visually-significant cataract and glaucoma in the right eye here for cataract extraction and intraocular lens insertion with goniotomy of the right eye.       Past Medical History  Past Medical History:   Diagnosis Date    Absolute glaucoma, unspecified eye 10/05/2022    Absolute glaucoma    Displaced fracture of lateral end of unspecified clavicle, initial encounter for closed fracture 03/18/2014    Fx clavicle, acrom end-closed    Displaced fracture of medial condyle of unspecified humerus, initial encounter for closed fracture 03/18/2014    Fx humer, med condyl-closed    Pain in right elbow 03/17/2014    Right elbow pain    Personal history of other diseases of the nervous system and sense organs 09/25/2015    History of labyrinthitis    Personal history of other diseases of the respiratory system 07/15/2014    Personal history of acute sinusitis    Personal history of other diseases of the respiratory system 02/05/2016    History of acute bronchitis    Personal history of other diseases of the respiratory system 02/05/2016    History of acute sinusitis    Personal history of other diseases of the respiratory system 01/06/2014    History of acute bronchitis    Personal history of other malignant neoplasm of skin 10/07/2014    Personal history of malignant neoplasm of skin    Personal history of other specified conditions 05/19/2015    History of vertigo    Unspecified fracture of unspecified acetabulum, initial encounter for closed fracture (CMS/[facility]) 02/22/2016    Fracture of acetabulum    Unspecified fracture of unspecified metacarpal bone, initial encounter for closed fracture 10/25/2013    Fracture, metacarpal       Surgical History  Past Surgical History:   Procedure Laterality Date    COLONOSCOPY  10/07/2014    Complete Colonoscopy    ELBOW SURGERY  10/11/2013    Elbow Surgery    NASAL SEPTUM SURGERY  10/11/2013    Nasal Septal  "Deviation Repair    PROSTATE SURGERY  10/11/2013    Prostate Surgery    TOTAL HIP ARTHROPLASTY  10/11/2013    Hip Replacement        Social History  He reports that he has never smoked. He has never used smokeless tobacco. He reports that he does not currently use alcohol. He reports that he does not use drugs.    Family History  No family history on file.     Allergies  Cat hair standardized allergenic extract, Bee venom protein (honey bee), Dog hair standardized allergenic extract, and Shrimp    Review of Systems   All other systems reviewed and are negative.       Physical Exam  Constitutional:       Appearance: Normal appearance.   HENT:      Head: Normocephalic and atraumatic.   Eyes:      Extraocular Movements: Extraocular movements intact.      Pupils: Pupils are equal, round, and reactive to light.   Cardiovascular:      Rate and Rhythm: Normal rate and regular rhythm.   Pulmonary:      Effort: Pulmonary effort is normal.      Breath sounds: Normal breath sounds.   Abdominal:      General: Abdomen is flat.   Neurological:      Mental Status: He is oriented to person, place, and time. Mental status is at baseline.          Last Recorded Vitals  Height 1.626 m (5' 4\"), weight 55.8 kg (123 lb).    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 Menjivar is a 89 yo male with hx of visually significant cataract and glaucoma of the right eye presenting for planned cataract extraction with intraocular lens insertion and goniotomy of the right eye. Plan to proceed with surgery as above.       Mihai Merrill MD    "

## 2023-12-12 ENCOUNTER — HOSPITAL ENCOUNTER (OUTPATIENT)
Facility: CLINIC | Age: 88
Setting detail: OUTPATIENT SURGERY
Discharge: HOME | End: 2023-12-12
Attending: OPHTHALMOLOGY | Admitting: OPHTHALMOLOGY
Payer: MEDICARE

## 2023-12-12 ENCOUNTER — ANESTHESIA (OUTPATIENT)
Dept: OPERATING ROOM | Facility: CLINIC | Age: 88
End: 2023-12-12
Payer: MEDICARE

## 2023-12-12 VITALS
BODY MASS INDEX: 22.36 KG/M2 | HEIGHT: 64 IN | TEMPERATURE: 97.3 F | RESPIRATION RATE: 16 BRPM | OXYGEN SATURATION: 96 % | WEIGHT: 130.95 LBS | SYSTOLIC BLOOD PRESSURE: 131 MMHG | HEART RATE: 71 BPM | DIASTOLIC BLOOD PRESSURE: 62 MMHG

## 2023-12-12 DIAGNOSIS — H25.011 CORTICAL AGE-RELATED CATARACT OF RIGHT EYE: ICD-10-CM

## 2023-12-12 DIAGNOSIS — H26.9 CATARACT OF BOTH EYES, UNSPECIFIED CATARACT TYPE: Primary | ICD-10-CM

## 2023-12-12 PROCEDURE — 2500000001 HC RX 250 WO HCPCS SELF ADMINISTERED DRUGS (ALT 637 FOR MEDICARE OP)

## 2023-12-12 PROCEDURE — 2500000004 HC RX 250 GENERAL PHARMACY W/ HCPCS (ALT 636 FOR OP/ED): Performed by: NURSE ANESTHETIST, CERTIFIED REGISTERED

## 2023-12-12 PROCEDURE — 7100000009 HC PHASE TWO TIME - INITIAL BASE CHARGE: Performed by: OPHTHALMOLOGY

## 2023-12-12 PROCEDURE — 3700000002 HC GENERAL ANESTHESIA TIME - EACH INCREMENTAL 1 MINUTE: Performed by: OPHTHALMOLOGY

## 2023-12-12 PROCEDURE — 7100000010 HC PHASE TWO TIME - EACH INCREMENTAL 1 MINUTE: Performed by: OPHTHALMOLOGY

## 2023-12-12 PROCEDURE — 2500000005 HC RX 250 GENERAL PHARMACY W/O HCPCS: Performed by: OPHTHALMOLOGY

## 2023-12-12 PROCEDURE — 3600000003 HC OR TIME - INITIAL BASE CHARGE - PROCEDURE LEVEL THREE: Performed by: OPHTHALMOLOGY

## 2023-12-12 PROCEDURE — 2500000004 HC RX 250 GENERAL PHARMACY W/ HCPCS (ALT 636 FOR OP/ED): Performed by: OPHTHALMOLOGY

## 2023-12-12 PROCEDURE — C1780 LENS, INTRAOCULAR (NEW TECH): HCPCS | Performed by: OPHTHALMOLOGY

## 2023-12-12 PROCEDURE — 2760000001 HC OR 276 NO HCPCS: Performed by: OPHTHALMOLOGY

## 2023-12-12 PROCEDURE — A65820 PR RELIEVE INNER EYE PRESSURE: Performed by: NURSE ANESTHETIST, CERTIFIED REGISTERED

## 2023-12-12 PROCEDURE — 3600000008 HC OR TIME - EACH INCREMENTAL 1 MINUTE - PROCEDURE LEVEL THREE: Performed by: OPHTHALMOLOGY

## 2023-12-12 PROCEDURE — 3700000001 HC GENERAL ANESTHESIA TIME - INITIAL BASE CHARGE: Performed by: OPHTHALMOLOGY

## 2023-12-12 PROCEDURE — 2720000007 HC OR 272 NO HCPCS: Performed by: OPHTHALMOLOGY

## 2023-12-12 PROCEDURE — 2500000001 HC RX 250 WO HCPCS SELF ADMINISTERED DRUGS (ALT 637 FOR MEDICARE OP): Performed by: OPHTHALMOLOGY

## 2023-12-12 PROCEDURE — 2500000004 HC RX 250 GENERAL PHARMACY W/ HCPCS (ALT 636 FOR OP/ED)

## 2023-12-12 DEVICE — ACRYSOF(R) IQ ASPHERIC NATURAL IOL, SINGLE-PIECE ACRYLIC FOLDABLE PCL, UV WITH BLUE LIGHTFILTER, 13.0MM LENGTH, 6.0MM ANTERIORASYMMETRIC BICONVEX OPTIC, PLANAR HAPTICS.
Type: IMPLANTABLE DEVICE | Site: EYE | Status: FUNCTIONAL
Brand: ACRYSOF®

## 2023-12-12 RX ORDER — PHENYLEPHRINE HYDROCHLORIDE 25 MG/ML
1 SOLUTION/ DROPS OPHTHALMIC
Status: COMPLETED | OUTPATIENT
Start: 2023-12-12 | End: 2023-12-12

## 2023-12-12 RX ORDER — DEXAMETHASONE SODIUM PHOSPHATE 100 MG/10ML
INJECTION INTRAMUSCULAR; INTRAVENOUS AS NEEDED
Status: DISCONTINUED | OUTPATIENT
Start: 2023-12-12 | End: 2023-12-12 | Stop reason: HOSPADM

## 2023-12-12 RX ORDER — MIDAZOLAM HYDROCHLORIDE 1 MG/ML
INJECTION, SOLUTION INTRAMUSCULAR; INTRAVENOUS AS NEEDED
Status: DISCONTINUED | OUTPATIENT
Start: 2023-12-12 | End: 2023-12-12

## 2023-12-12 RX ORDER — CYCLOPENTOLATE HYDROCHLORIDE 10 MG/ML
1 SOLUTION/ DROPS OPHTHALMIC
Status: COMPLETED | OUTPATIENT
Start: 2023-12-12 | End: 2023-12-12

## 2023-12-12 RX ORDER — POVIDONE-IODINE 5 %
SOLUTION, NON-ORAL OPHTHALMIC (EYE) AS NEEDED
Status: DISCONTINUED | OUTPATIENT
Start: 2023-12-12 | End: 2023-12-12 | Stop reason: HOSPADM

## 2023-12-12 RX ORDER — TETRACAINE HYDROCHLORIDE 5 MG/ML
SOLUTION OPHTHALMIC AS NEEDED
Status: DISCONTINUED | OUTPATIENT
Start: 2023-12-12 | End: 2023-12-12 | Stop reason: HOSPADM

## 2023-12-12 RX ORDER — LIDOCAINE HYDROCHLORIDE 20 MG/ML
JELLY TOPICAL AS NEEDED
Status: DISCONTINUED | OUTPATIENT
Start: 2023-12-12 | End: 2023-12-12 | Stop reason: HOSPADM

## 2023-12-12 RX ORDER — LIDOCAINE HYDROCHLORIDE 10 MG/ML
INJECTION INFILTRATION; PERINEURAL AS NEEDED
Status: DISCONTINUED | OUTPATIENT
Start: 2023-12-12 | End: 2023-12-12 | Stop reason: HOSPADM

## 2023-12-12 RX ORDER — PROPOFOL 10 MG/ML
INJECTION, EMULSION INTRAVENOUS AS NEEDED
Status: DISCONTINUED | OUTPATIENT
Start: 2023-12-12 | End: 2023-12-12

## 2023-12-12 RX ORDER — TROPICAMIDE 10 MG/ML
1 SOLUTION/ DROPS OPHTHALMIC
Status: COMPLETED | OUTPATIENT
Start: 2023-12-12 | End: 2023-12-12

## 2023-12-12 RX ORDER — TETRACAINE HYDROCHLORIDE 5 MG/ML
1 SOLUTION OPHTHALMIC ONCE
Status: COMPLETED | OUTPATIENT
Start: 2023-12-12 | End: 2023-12-12

## 2023-12-12 RX ORDER — CEFAZOLIN 1 G/1
INJECTION, POWDER, FOR SOLUTION INTRAVENOUS AS NEEDED
Status: DISCONTINUED | OUTPATIENT
Start: 2023-12-12 | End: 2023-12-12 | Stop reason: HOSPADM

## 2023-12-12 RX ORDER — SODIUM CHLORIDE 9 MG/ML
100 INJECTION, SOLUTION INTRAVENOUS CONTINUOUS
Status: DISCONTINUED | OUTPATIENT
Start: 2023-12-12 | End: 2023-12-12 | Stop reason: HOSPADM

## 2023-12-12 RX ORDER — FENTANYL CITRATE 50 UG/ML
INJECTION, SOLUTION INTRAMUSCULAR; INTRAVENOUS AS NEEDED
Status: DISCONTINUED | OUTPATIENT
Start: 2023-12-12 | End: 2023-12-12

## 2023-12-12 RX ORDER — MOXIFLOXACIN 5 MG/ML
1 SOLUTION/ DROPS OPHTHALMIC ONCE
Status: COMPLETED | OUTPATIENT
Start: 2023-12-12 | End: 2023-12-12

## 2023-12-12 RX ADMIN — CYCLOPENTOLATE HYDROCHLORIDE 1 DROP: 10 SOLUTION/ DROPS OPHTHALMIC at 09:25

## 2023-12-12 RX ADMIN — SODIUM CHLORIDE, SODIUM LACTATE, POTASSIUM CHLORIDE, AND CALCIUM CHLORIDE: .6; .31; .03; .02 INJECTION, SOLUTION INTRAVENOUS at 10:10

## 2023-12-12 RX ADMIN — FENTANYL CITRATE 50 MCG: 0.05 INJECTION, SOLUTION INTRAMUSCULAR; INTRAVENOUS at 10:16

## 2023-12-12 RX ADMIN — MIDAZOLAM HYDROCHLORIDE 0.5 MG: 1 INJECTION, SOLUTION INTRAMUSCULAR; INTRAVENOUS at 10:19

## 2023-12-12 RX ADMIN — MOXIFLOXACIN OPHTHALMIC SOLUTION 1 DROP: 5 SOLUTION/ DROPS OPHTHALMIC at 09:15

## 2023-12-12 RX ADMIN — PHENYLEPHRINE HYDROCHLORIDE 1 DROP: 25 SOLUTION/ DROPS OPHTHALMIC at 09:25

## 2023-12-12 RX ADMIN — PHENYLEPHRINE HYDROCHLORIDE 1 DROP: 25 SOLUTION/ DROPS OPHTHALMIC at 09:20

## 2023-12-12 RX ADMIN — TROPICAMIDE 1 DROP: 10 SOLUTION/ DROPS OPHTHALMIC at 09:25

## 2023-12-12 RX ADMIN — CYCLOPENTOLATE HYDROCHLORIDE 1 DROP: 10 SOLUTION/ DROPS OPHTHALMIC at 09:15

## 2023-12-12 RX ADMIN — CYCLOPENTOLATE HYDROCHLORIDE 1 DROP: 10 SOLUTION/ DROPS OPHTHALMIC at 09:20

## 2023-12-12 RX ADMIN — PROPOFOL 10 MG: 10 INJECTION, EMULSION INTRAVENOUS at 10:59

## 2023-12-12 RX ADMIN — PHENYLEPHRINE HYDROCHLORIDE 1 DROP: 25 SOLUTION/ DROPS OPHTHALMIC at 09:15

## 2023-12-12 RX ADMIN — TROPICAMIDE 1 DROP: 10 SOLUTION/ DROPS OPHTHALMIC at 09:20

## 2023-12-12 RX ADMIN — FENTANYL CITRATE 50 MCG: 0.05 INJECTION, SOLUTION INTRAMUSCULAR; INTRAVENOUS at 10:26

## 2023-12-12 RX ADMIN — PROPOFOL 10 MG: 10 INJECTION, EMULSION INTRAVENOUS at 10:53

## 2023-12-12 RX ADMIN — PROPOFOL 30 MG: 10 INJECTION, EMULSION INTRAVENOUS at 11:04

## 2023-12-12 RX ADMIN — MIDAZOLAM HYDROCHLORIDE 0.5 MG: 1 INJECTION, SOLUTION INTRAMUSCULAR; INTRAVENOUS at 10:16

## 2023-12-12 RX ADMIN — TETRACAINE HYDROCHLORIDE 1 DROP: 5 SOLUTION OPHTHALMIC at 09:15

## 2023-12-12 RX ADMIN — TROPICAMIDE 1 DROP: 10 SOLUTION/ DROPS OPHTHALMIC at 09:15

## 2023-12-12 SDOH — HEALTH STABILITY: MENTAL HEALTH: CURRENT SMOKER: 0

## 2023-12-12 ASSESSMENT — PAIN - FUNCTIONAL ASSESSMENT
PAIN_FUNCTIONAL_ASSESSMENT: 0-10

## 2023-12-12 ASSESSMENT — PAIN SCALES - GENERAL
PAINLEVEL_OUTOF10: 0 - NO PAIN

## 2023-12-12 NOTE — OP NOTE
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 [H25.011]     * Primary open angle glaucoma of right eye, mild stage [H40.1111]     Procedures  Extraction Cataract with Insertion Intraocular Lens  82965 - MN XCAPSL CTRC RMVL INSJ IO LENS PROSTH W/O ECP    Goniotomy  63415 - MN GONIOTOMY      Surgeons      * Tal Tejada - Primary    Resident/Fellow/Other Assistant:  Surgeon(s) and Role: Mihai Merrill, Resident    Procedure Summary  Anesthesia: Monitor Anesthesia Care  ASA: III  Anesthesia Staff: CRNA: BRITANY Shi-CRNA  SRNA: Alisha Montano  Estimated Blood Loss: <5mL  Intra-op Medications:   Medication Name Total Dose   balanced salts (BSS) intraocular solution 500 mL   ceFAZolin (Ancef) injection 1 g   dexAMETHasone (Decadron) injection 5 mg   povidone-iodine 5 % ophthalmic solution 1 Application   tetracaine (Altacaine) 0.5 % ophthalmic solution 2 drop   tobramycin-dexamethasone (Tobradex) ophthalmic ointment 0.5 inch   lidocaine (Xylocaine) 10 mg/mL (1 %) injection 1 mL   chondroitin sulf-sod hyaluron (Duovisc) intraocular kit 0.5 mL   balanced salts (BSS) intraocular solution 15 mL   lidocaine (Uro-Jet) 2 % gel 1 mL   sodium hyaluronate (Healon) intraocular injection 10 mg              Anesthesia Record               Intraprocedure I/O Totals       None           Specimen: No specimens collected     Staff:   Circulator: Alisha Payan RN  Scrub Person: Honey Harris         Drains and/or Catheters: * None in log *    Tourniquet Times:         Implants:  Implants       Type Name Action Serial No.      Lens  LENS, INTRAOCULAR, SN60WF 18.0 - [id number] Implanted [id number]              Findings: Cataract and glaucoma, right eye    Indications: Tomasz Menjivar is an 89 y.o. 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     The patient was seen in the preoperative area. The risks, benefits, complications, treatment options, non-operative alternatives, expected recovery and outcomes were discussed with the patient. The possibilities of reaction to medication, pulmonary aspiration, injury to surrounding structures, bleeding, recurrent infection, the need for additional procedures, failure to diagnose a condition, and creating a complication requiring transfusion or operation were discussed with the patient. The patient concurred with the proposed plan, giving informed consent.  The site of surgery was properly noted/marked if necessary per policy. The patient has been actively warmed in preoperative area. Preoperative antibiotics are not indicated. Venous thrombosis prophylaxis are not indicated.    Procedure Details:   Procedure note: Patient was identified using two unique identifiers in the preoperative area and the operative side was marked on the forehead using a marking pen after the patient stated procedure and laterality which was confirmed by reviewing the informed consent form. The patient was then taken to the operative suite where one drop of tetracaine was placed in the operative eye. The area was then prepped and draped in the standard sterile fashion for intraocular surgery of the right eye.    The head and microscope were positioned for angle surgery. A 15 degree supersharp blade was used enter the anterior chamber to create a paracentesis side port incision. A 2.65 mm keratome was used to create a beveled temporal clear corneal incision. The anterior chamber was infused with preservative free intraocular  lidocaine. The anterior chamber was infused with Viscoat viscoelastic and some was placed on the dome of the cornea. Under direct visualization, the [place] Dual Blade knife was inserted into the anterior chamber. A modified Alexis Grant lens was used to visualize the anterior chamber angle. The KDB blade was inserted into the Schlemms canal and approximately 100 degrees of nasal trabecular meshwork was ablated. Appropriate reflux blood was present. The head and microscope were re-positioned for cataract surgery.    A dispersive viscoelastic was used to maintain the anterior chamber. A curvilinear continuous capsulorhexis was initiated using a bent cystitome needle and completed using utrata forceps. Hydrodissection and hydrodelineation were performed. Phacoemulsification was used to remove all nuclear material. The phaco energy time was 71.83. Irrigation/aspiration was used to remove all cortical material. The anterior chamber was refilled using a cohesive viscoelastic. The SN60WF 18.0D lens was inserted into the capsular bag using the injector system and the trailing haptic was gently positioned within the capsular bag using a Sinsky hook. Irrigation/aspiration was used remove all viscoelastic material. The anterior chamber was refilled using BSS and all wounds were stromally hydrated and found to be water tight by dry weck cell testing. The paracentesis wound was found to have a mild leak so a 10-0 nylon suture was placed in the paracentesis wound and the wound was checked to be sealed adequately. The pressure was approximately 15 mmHg by applanation Subtenon’s injections of ancef and decadron were given. Tobradex ointment, sterile patch and shield were then placed over the eye, and the patient was taken to recovery having tolerated the procedure well.      Complications:  None; patient tolerated the procedure well.    Disposition: PACU - hemodynamically stable.  Condition: stable         Additional Details:  none    Attending Attestation:     Tal Tejada  Phone Number: [phone]

## 2023-12-12 NOTE — ANESTHESIA POSTPROCEDURE EVALUATION
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 [H25.011])      (Primary open angle glaucoma of right eye, mild stage [H40.1111])    Surgeons: Tal Tejada MD Responsible Provider: DHAVAL Shi    Anesthesia Type: MAC ASA Status: 3            Anesthesia Type: MAC    Vitals Value Taken Time   /88 12/12/23 1130   Temp 36.3 °C (97.3 °F) 12/12/23 1117   Pulse 77 12/12/23 1130   Resp 16 12/12/23 1130   SpO2 96 % 12/12/23 1117       Anesthesia Post Evaluation    Patient location during evaluation: bedside  Patient participation: complete - patient participated  Level of consciousness: awake and alert  Pain management: adequate  Airway patency: patent  Cardiovascular status: acceptable  Respiratory status: acceptable  Hydration status: acceptable  Postoperative Nausea and Vomiting: none        There were no known notable events for this encounter.

## 2023-12-12 NOTE — ANESTHESIA PREPROCEDURE EVALUATION
Patient: Tomasz Menjivar    Procedure Information       Anesthesia Start Date/Time: 12/12/23 1010    Procedures:       Extraction Cataract with Insertion Intraocular Lens (Right)      Goniotomy (Right)    Location: [facility] OR 03 / [facility] OR    Surgeons: Tal Tejada MD            Relevant Problems   Anesthesia (within normal limits)      Cardiovascular   (+) Primary hypertension      Endocrine (within normal limits)      /Renal (within normal limits)      Pulmonary   (+) COPD with asthma      Hematology (within normal limits)      Musculoskeletal (within normal limits)      Eyes, Ears, Nose, and Throat   (+) Other specified glaucoma   (+) Primary open angle glaucoma of right eye, mild stage      Infectious Disease (within normal limits)       Clinical information reviewed:   Tobacco  Allergies  Meds   Med Hx  Surg Hx   Fam Hx  Soc Hx        NPO Detail:  NPO/Void Status  Carbonhydrate Drink Given Prior to Surgery? : N  Date of Last Liquid: 12/11/23  Time of Last Liquid: 2100  Date of Last Solid: 12/11/23  Time of Last Solid: 2100  Last Intake Type: Food  Time of Last Void: 0700         Physical Exam    Airway  Mallampati: II  TM distance: >3 FB  Neck ROM: full     Cardiovascular - normal exam     Dental    Pulmonary - normal exam     Abdominal      Other findings: Lower partials          Anesthesia Plan    ASA 3     MAC     The patient is not a current smoker.    Anesthetic plan and risks discussed with patient.

## 2023-12-13 ENCOUNTER — OFFICE VISIT (OUTPATIENT)
Dept: OPHTHALMOLOGY | Facility: CLINIC | Age: 88
End: 2023-12-13
Payer: MEDICARE

## 2023-12-13 DIAGNOSIS — Z96.1 PSEUDOPHAKIA OF BOTH EYES: Primary | ICD-10-CM

## 2023-12-13 PROCEDURE — 99024 POSTOP FOLLOW-UP VISIT: CPT | Performed by: OPHTHALMOLOGY

## 2023-12-13 ASSESSMENT — SLIT LAMP EXAM - LIDS
COMMENTS: NORMAL
COMMENTS: NORMAL

## 2023-12-13 ASSESSMENT — CUP TO DISC RATIO
OD_RATIO: 0.7
OS_RATIO: 0.7

## 2023-12-13 ASSESSMENT — ENCOUNTER SYMPTOMS
GASTROINTESTINAL NEGATIVE: 0
PSYCHIATRIC NEGATIVE: 0
CARDIOVASCULAR NEGATIVE: 0
HEMATOLOGIC/LYMPHATIC NEGATIVE: 0
CONSTITUTIONAL NEGATIVE: 0
MUSCULOSKELETAL NEGATIVE: 0
NEUROLOGICAL NEGATIVE: 0
ALLERGIC/IMMUNOLOGIC NEGATIVE: 0
RESPIRATORY NEGATIVE: 0
EYES NEGATIVE: 0
ENDOCRINE NEGATIVE: 0

## 2023-12-13 ASSESSMENT — VISUAL ACUITY
OD_SC: 20/60
OD_SC+: +2
OD_PH_SC: 20/30
OD_PH_SC+: -2
METHOD: SNELLEN - LINEAR

## 2023-12-13 ASSESSMENT — PACHYMETRY
OD_CT(UM): 525
OS_CT(UM): 520

## 2023-12-13 ASSESSMENT — EXTERNAL EXAM - LEFT EYE: OS_EXAM: NORMAL

## 2023-12-13 ASSESSMENT — EXTERNAL EXAM - RIGHT EYE: OD_EXAM: NORMAL

## 2023-12-13 ASSESSMENT — TONOMETRY
OD_IOP_MMHG: 11
IOP_METHOD: GOLDMANN APPLANATION

## 2023-12-13 NOTE — PROGRESS NOTES
Visual Acuity (Snellen - Linear)         Right Left    Dist sc 20/60 +2     Dist ph sc 20/30 -2           Tonometry       Tonometry (Goldmann Applanation, 10:40 AM)         Right Left    Pressure 11                   Assessment/Plan   Last dilated:  11/16/23  last HVF: 1/12/23    1.  POD#1 s/p combined with KDB 12/12/23:  pre-op VA 20/40, IOP = 14 mmHg.  Pt's VA is c/w corneal edema which is c/w density of cataract.  Will not remove 10-0 today     Plan:  increase prednisolone acetate 1% OD TID -> QID            increase ofloxacin OD TID -> QID            increase ketorolac OD TID -> QID            cont activity restrictions            glaucoma drops as below            written instructions given            f/u 1 week (t/c removal 10-0 nylon)    2.  Primary Open-Angle Glaucoma OU:  /520 IOPs are acceptable given early stage of disease     Plan:  restrict xalatan OU -> OS QHS            cont brimonidine 0.2% OU BID    2.  Pseudophakia (PCIOL) OU:   Given epiretinal membrane (ERM), would not likely realize the benefit of the toric intraocular lens (IOL) and the astigmatism is against-the-rule astigmatism (ATR) where my incision is.  Pt chooses standard IOL.      Plan:  as above    3.  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

## 2023-12-20 ENCOUNTER — OFFICE VISIT (OUTPATIENT)
Dept: OPHTHALMOLOGY | Facility: CLINIC | Age: 88
End: 2023-12-20
Payer: MEDICARE

## 2023-12-20 DIAGNOSIS — Z96.1 PSEUDOPHAKIA OF BOTH EYES: Primary | ICD-10-CM

## 2023-12-20 PROCEDURE — 99024 POSTOP FOLLOW-UP VISIT: CPT | Performed by: OPHTHALMOLOGY

## 2023-12-20 ASSESSMENT — ENCOUNTER SYMPTOMS
ENDOCRINE NEGATIVE: 0
EYES NEGATIVE: 0
CONSTITUTIONAL NEGATIVE: 0
MUSCULOSKELETAL NEGATIVE: 0
NEUROLOGICAL NEGATIVE: 0
CARDIOVASCULAR NEGATIVE: 0
ALLERGIC/IMMUNOLOGIC NEGATIVE: 0
RESPIRATORY NEGATIVE: 0
GASTROINTESTINAL NEGATIVE: 0
PSYCHIATRIC NEGATIVE: 0
HEMATOLOGIC/LYMPHATIC NEGATIVE: 0

## 2023-12-20 ASSESSMENT — TONOMETRY
OS_IOP_MMHG: 13
IOP_METHOD: GOLDMANN APPLANATION
OD_IOP_MMHG: 11

## 2023-12-20 ASSESSMENT — PACHYMETRY
OD_CT(UM): 525
OS_CT(UM): 520

## 2023-12-20 ASSESSMENT — CUP TO DISC RATIO
OS_RATIO: 0.7
OD_RATIO: 0.7

## 2023-12-20 ASSESSMENT — SLIT LAMP EXAM - LIDS
COMMENTS: NORMAL
COMMENTS: NORMAL

## 2023-12-20 ASSESSMENT — VISUAL ACUITY
OD_CC+: -1
CORRECTION_TYPE: GLASSES
METHOD: SNELLEN - LINEAR
OD_CC: 20/30
OS_CC: 20/20

## 2023-12-20 ASSESSMENT — EXTERNAL EXAM - RIGHT EYE: OD_EXAM: NORMAL

## 2023-12-20 ASSESSMENT — EXTERNAL EXAM - LEFT EYE: OS_EXAM: NORMAL

## 2023-12-20 NOTE — PROGRESS NOTES
Visual Acuity (Snellen - Linear)         Right Left    Dist cc 20/30 -1 20/20      Correction: Glasses          Tonometry       Tonometry (Goldmann Applanation, 11:18 AM)         Right Left    Pressure 11 13                  Assessment/Plan   Last dilated:  11/16/23  last HVF: 1/12/23    1.  POD#8 s/p combined with KDB 12/12/23:  pre-op VA 20/40, IOP = 14 mmHg.  Pt's VA is c/w corneal edema which is c/w density of cataract.  Removed 10-0 today with Jewlers and 27 gauge needle     Plan:  taper prednisolone acetate 1% OD QID -> BID x 1 wk, then QD x 1 wk, then stop            d/c ofloxacin             d/c ketorolac            d/c activity restrictions            glaucoma drops as below            written instructions given            f/u 1 month (MRx)    2.  Primary Open-Angle Glaucoma OU:  /520 IOPs are acceptable given early stage of disease.  Pt had been using Xalatan in both eyes in spite of directions.     Plan:  cont xalatan OU QHS            cont brimonidine 0.2% OU BID    2.  Pseudophakia (PCIOL) OU:   Given epiretinal membrane (ERM), would not likely realize the benefit of the toric intraocular lens (IOL) and the astigmatism is against-the-rule astigmatism (ATR) where my incision is.  Pt chooses standard IOL.      Plan:  as above    3.  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

## 2024-01-23 DIAGNOSIS — R09.89 CHEST CONGESTION: ICD-10-CM

## 2024-01-23 DIAGNOSIS — R05.1 ACUTE COUGH: Primary | ICD-10-CM

## 2024-01-23 RX ORDER — METHYLPREDNISOLONE 4 MG/1
TABLET ORAL
Qty: 21 TABLET | Refills: 0 | Status: SHIPPED | OUTPATIENT
Start: 2024-01-23 | End: 2024-01-30

## 2024-01-24 ENCOUNTER — OFFICE VISIT (OUTPATIENT)
Dept: OPHTHALMOLOGY | Facility: CLINIC | Age: 89
End: 2024-01-24
Payer: MEDICARE

## 2024-01-24 DIAGNOSIS — H40.1111 PRIMARY OPEN ANGLE GLAUCOMA OF RIGHT EYE, MILD STAGE: ICD-10-CM

## 2024-01-24 DIAGNOSIS — Z96.1 PSEUDOPHAKIA OF BOTH EYES: Primary | ICD-10-CM

## 2024-01-24 PROCEDURE — 1126F AMNT PAIN NOTED NONE PRSNT: CPT | Performed by: OPHTHALMOLOGY

## 2024-01-24 PROCEDURE — 99024 POSTOP FOLLOW-UP VISIT: CPT | Performed by: OPHTHALMOLOGY

## 2024-01-24 PROCEDURE — 1036F TOBACCO NON-USER: CPT | Performed by: OPHTHALMOLOGY

## 2024-01-24 ASSESSMENT — ENCOUNTER SYMPTOMS
RESPIRATORY NEGATIVE: 0
NEUROLOGICAL NEGATIVE: 0
MUSCULOSKELETAL NEGATIVE: 0
CONSTITUTIONAL NEGATIVE: 0
ENDOCRINE NEGATIVE: 0
ALLERGIC/IMMUNOLOGIC NEGATIVE: 0
CARDIOVASCULAR NEGATIVE: 0
PSYCHIATRIC NEGATIVE: 0
EYES NEGATIVE: 0
HEMATOLOGIC/LYMPHATIC NEGATIVE: 0
GASTROINTESTINAL NEGATIVE: 0

## 2024-01-24 ASSESSMENT — REFRACTION_MANIFEST
OS_CYLINDER: -0.50
OS_AXIS: 090
OD_ADD: +2.50
OS_SPHERE: -4.00
OS_ADD: +2.75
OD_SPHERE: PLANO
OD_CYLINDER: -0.50
OD_AXIS: 090

## 2024-01-24 ASSESSMENT — CUP TO DISC RATIO
OS_RATIO: 0.7
OD_RATIO: 0.7

## 2024-01-24 ASSESSMENT — SLIT LAMP EXAM - LIDS
COMMENTS: NORMAL
COMMENTS: NORMAL

## 2024-01-24 ASSESSMENT — TONOMETRY
OD_IOP_MMHG: 11
OS_IOP_MMHG: 13
IOP_METHOD: GOLDMANN APPLANATION

## 2024-01-24 ASSESSMENT — EXTERNAL EXAM - RIGHT EYE: OD_EXAM: NORMAL

## 2024-01-24 ASSESSMENT — PACHYMETRY
OD_CT(UM): 525
OS_CT(UM): 520

## 2024-01-24 ASSESSMENT — VISUAL ACUITY
OS_SC: 20/200
METHOD: SNELLEN - LINEAR
OD_SC: 20/25

## 2024-01-24 ASSESSMENT — EXTERNAL EXAM - LEFT EYE: OS_EXAM: NORMAL

## 2024-01-24 NOTE — PROGRESS NOTES
Visual Acuity (Snellen - Linear)         Right Left    Dist sc 20/25 20/200          Tonometry       Tonometry (Goldmann Applanation, 10:41 AM)         Right Left    Pressure 11 13                  Assessment/Plan   Last dilated:  11/16/23  last HVF: 1/12/23    1.  6 weeks s/p combined with KDB 12/12/23:  pre-op VA 20/40, IOP = 14 mmHg.  Pt's VA is c/w corneal edema which is c/w density of cataract.  Removed 10-0 today with Jewlers and 27 gauge needle     Plan:  MRx given               F/u 2  months (HVF)    2.  Primary Open-Angle Glaucoma OU:  /520 IOPs are acceptable given early stage of disease.  Pt had been using Xalatan in both eyes in spite of directions.     Plan:  cont xalatan OU QHS               cont brimonidine 0.2% OU BID    2.  Pseudophakia (PCIOL) OU:   Given epiretinal membrane (ERM), would not likely realize the benefit of the toric intraocular lens (IOL) and the astigmatism is against-the-rule astigmatism (ATR) where my incision is.  Pt chooses standard IOL.      Plan:  as above    3.  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

## 2024-03-28 ENCOUNTER — OFFICE VISIT (OUTPATIENT)
Dept: OPHTHALMOLOGY | Facility: CLINIC | Age: 89
End: 2024-03-28
Payer: MEDICARE

## 2024-03-28 DIAGNOSIS — Z96.1 PSEUDOPHAKIA OF BOTH EYES: ICD-10-CM

## 2024-03-28 DIAGNOSIS — H40.1111 PRIMARY OPEN ANGLE GLAUCOMA (POAG) OF RIGHT EYE, MILD STAGE: Primary | ICD-10-CM

## 2024-03-28 DIAGNOSIS — H40.1111 PRIMARY OPEN ANGLE GLAUCOMA OF RIGHT EYE, MILD STAGE: ICD-10-CM

## 2024-03-28 PROCEDURE — 99213 OFFICE O/P EST LOW 20 MIN: CPT | Performed by: OPHTHALMOLOGY

## 2024-03-28 PROCEDURE — 92083 EXTENDED VISUAL FIELD XM: CPT | Performed by: OPHTHALMOLOGY

## 2024-03-28 ASSESSMENT — ENCOUNTER SYMPTOMS
CARDIOVASCULAR NEGATIVE: 0
NEUROLOGICAL NEGATIVE: 0
RESPIRATORY NEGATIVE: 0
PSYCHIATRIC NEGATIVE: 0
GASTROINTESTINAL NEGATIVE: 0
MUSCULOSKELETAL NEGATIVE: 0
ENDOCRINE NEGATIVE: 0
CONSTITUTIONAL NEGATIVE: 0
EYES NEGATIVE: 1
HEMATOLOGIC/LYMPHATIC NEGATIVE: 0
ALLERGIC/IMMUNOLOGIC NEGATIVE: 0

## 2024-03-28 ASSESSMENT — PACHYMETRY
OD_CT(UM): 525
OS_CT(UM): 520

## 2024-03-28 ASSESSMENT — VISUAL ACUITY
OS_PH_CC: 20/60
METHOD: SNELLEN - LINEAR
OS_CC: 20/70
OS_PH_CC+: +1
OD_SC: 20/20
OD_SC+: -1

## 2024-03-28 ASSESSMENT — TONOMETRY
OD_IOP_MMHG: 13
OS_IOP_MMHG: 14
IOP_METHOD: GOLDMANN APPLANATION

## 2024-03-28 NOTE — PROGRESS NOTES
Visual Acuity (Snellen - Linear)         Right Left    Dist sc 20/20 -1     Dist cc  20/70    Dist ph cc  20/60 +1          Tonometry       Tonometry (Goldmann Applanation, 10:26 AM)         Right Left    Pressure 13 14                  Assessment/Plan   Last dilated:  11/16/23  last HVF: 3/28/24    1.   Primary Open-Angle Glaucoma OU:  /520 IOPs are acceptable given early stage of disease.  Pt had been using Xalatan in both eyes in spite of directions. s/p combined with KDB 12/12/23:  pre-op VA 20/40, IOP = 14 mmHg.  IOP is well controlled     Plan:  cont xalatan OU QHS               cont brimonidine 0.2% OU BID               F/u 4 month    2.  Pseudophakia (PCIOL) OU:   Given epiretinal membrane (ERM), would not likely realize the benefit of the toric intraocular lens (IOL) and the astigmatism is against-the-rule astigmatism (ATR) where my incision is.  Pt chooses standard IOL.      Plan:  monitor    3.  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

## 2024-04-20 DIAGNOSIS — H40.1132 PRIMARY OPEN ANGLE GLAUCOMA OF BOTH EYES, MODERATE STAGE: ICD-10-CM

## 2024-04-20 RX ORDER — BRIMONIDINE TARTRATE 2 MG/ML
SOLUTION/ DROPS OPHTHALMIC
Qty: 10 ML | Refills: 0 | Status: SHIPPED | OUTPATIENT
Start: 2024-04-20

## 2024-07-18 ENCOUNTER — APPOINTMENT (OUTPATIENT)
Dept: OPHTHALMOLOGY | Facility: CLINIC | Age: 89
End: 2024-07-18
Payer: MEDICARE

## 2024-07-18 DIAGNOSIS — H40.1132 PRIMARY OPEN ANGLE GLAUCOMA OF BOTH EYES, MODERATE STAGE: ICD-10-CM

## 2024-07-18 PROCEDURE — 99213 OFFICE O/P EST LOW 20 MIN: CPT | Performed by: OPHTHALMOLOGY

## 2024-07-18 RX ORDER — BRIMONIDINE TARTRATE 2 MG/ML
1 SOLUTION/ DROPS OPHTHALMIC 2 TIMES DAILY
Qty: 10 ML | Refills: 11 | Status: SHIPPED | OUTPATIENT
Start: 2024-07-18 | End: 2025-07-18

## 2024-07-18 RX ORDER — LATANOPROST 50 UG/ML
1 SOLUTION/ DROPS OPHTHALMIC NIGHTLY
Qty: 2.5 ML | Refills: 11 | Status: SHIPPED | OUTPATIENT
Start: 2024-07-18

## 2024-07-18 ASSESSMENT — VISUAL ACUITY
OS_SC+: -1
OD_SC+: -1
METHOD: SNELLEN - LINEAR
OD_SC: 20/20
OS_SC: 20/20

## 2024-07-18 ASSESSMENT — ENCOUNTER SYMPTOMS: EYES NEGATIVE: 1

## 2024-07-18 ASSESSMENT — CUP TO DISC RATIO
OS_RATIO: 0.7
OD_RATIO: 0.7

## 2024-07-18 ASSESSMENT — TONOMETRY
IOP_METHOD: GOLDMANN APPLANATION
OS_IOP_MMHG: 12
OD_IOP_MMHG: 13

## 2024-07-18 ASSESSMENT — EXTERNAL EXAM - RIGHT EYE: OD_EXAM: NORMAL

## 2024-07-18 ASSESSMENT — PACHYMETRY
OS_CT(UM): 520
OD_CT(UM): 525

## 2024-07-18 ASSESSMENT — EXTERNAL EXAM - LEFT EYE: OS_EXAM: NORMAL

## 2024-07-18 ASSESSMENT — SLIT LAMP EXAM - LIDS
COMMENTS: NORMAL
COMMENTS: NORMAL

## 2024-07-18 NOTE — PROGRESS NOTES
Visual Acuity (Snellen - Linear)         Right Left    Dist sc 20/20 -1 20/20 -1          Tonometry       Tonometry (Goldmann Applanation, 10:50 AM)         Right Left    Pressure 13 12                  Assessment/Plan   Last dilated:  11/16/23  last HVF: 3/28/24    1.   Primary Open-Angle Glaucoma OU:  /520 IOPs are acceptable given early stage of disease.  Pt had been using Xalatan in both eyes in spite of directions. s/p combined with KDB 12/12/23:  pre-op VA 20/40, IOP = 14 mmHg.  IOP is well controlled     Plan:  cont xalatan OU QHS               cont brimonidine 0.2% OU BID               F/u 4 month (dilation, RNFL)    2.  Pseudophakia (PCIOL) OU:   Given epiretinal membrane (ERM), would not likely realize the benefit of the toric intraocular lens (IOL) and the astigmatism is against-the-rule astigmatism (ATR) where my incision is.  Pt chooses standard IOL.      Plan:  monitor    3.  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

## 2024-07-22 DIAGNOSIS — I10 HYPERTENSION, UNSPECIFIED TYPE: Primary | ICD-10-CM

## 2024-07-22 RX ORDER — HYDROCHLOROTHIAZIDE 25 MG/1
25 TABLET ORAL DAILY
Qty: 90 TABLET | Refills: 3 | Status: SHIPPED | OUTPATIENT
Start: 2024-07-22

## 2024-10-04 DIAGNOSIS — I10 HYPERTENSION, UNSPECIFIED TYPE: Primary | ICD-10-CM

## 2024-10-04 RX ORDER — LOSARTAN POTASSIUM 100 MG/1
100 TABLET ORAL DAILY
Qty: 90 TABLET | Refills: 3 | Status: SHIPPED | OUTPATIENT
Start: 2024-10-04

## 2024-11-07 ENCOUNTER — APPOINTMENT (OUTPATIENT)
Dept: OPHTHALMOLOGY | Facility: CLINIC | Age: 89
End: 2024-11-07
Payer: MEDICARE

## 2025-01-09 ENCOUNTER — APPOINTMENT (OUTPATIENT)
Dept: OPHTHALMOLOGY | Facility: CLINIC | Age: OVER 89
End: 2025-01-09
Payer: MEDICARE

## 2025-01-09 DIAGNOSIS — H40.1132 PRIMARY OPEN ANGLE GLAUCOMA OF BOTH EYES, MODERATE STAGE: Primary | ICD-10-CM

## 2025-01-09 PROCEDURE — 92020 GONIOSCOPY: CPT | Performed by: OPHTHALMOLOGY

## 2025-01-09 PROCEDURE — 99214 OFFICE O/P EST MOD 30 MIN: CPT | Performed by: OPHTHALMOLOGY

## 2025-01-09 PROCEDURE — 92133 CPTRZD OPH DX IMG PST SGM ON: CPT | Performed by: OPHTHALMOLOGY

## 2025-01-09 ASSESSMENT — GONIOSCOPY
OS_TEMPORAL: D45R 1+ PTM
OD_NASAL: D45R 2+ PTM
OD_TEMPORAL: D45R 2+ PTM
OD_SUPERIOR: D45R 2+ PTM
OS_SUPERIOR: D45R 1+ PTM
OS_NASAL: D45R 1+ PTM
OD_INFERIOR: D45R 2+ PTM
OS_INFERIOR: D45R 1+ PTM

## 2025-01-09 ASSESSMENT — CUP TO DISC RATIO
OD_RATIO: 0.7
OS_RATIO: 0.7

## 2025-01-09 ASSESSMENT — EXTERNAL EXAM - RIGHT EYE: OD_EXAM: NORMAL

## 2025-01-09 ASSESSMENT — ENCOUNTER SYMPTOMS
GASTROINTESTINAL NEGATIVE: 0
PSYCHIATRIC NEGATIVE: 0
EYES NEGATIVE: 1
NEUROLOGICAL NEGATIVE: 0
RESPIRATORY NEGATIVE: 0
CARDIOVASCULAR NEGATIVE: 0
CONSTITUTIONAL NEGATIVE: 0
ALLERGIC/IMMUNOLOGIC NEGATIVE: 0
HEMATOLOGIC/LYMPHATIC NEGATIVE: 0
MUSCULOSKELETAL NEGATIVE: 0
ENDOCRINE NEGATIVE: 0

## 2025-01-09 ASSESSMENT — PACHYMETRY
OD_CT(UM): 525
OS_CT(UM): 520

## 2025-01-09 ASSESSMENT — EXTERNAL EXAM - LEFT EYE: OS_EXAM: NORMAL

## 2025-01-09 ASSESSMENT — TONOMETRY
OD_IOP_MMHG: 13
IOP_METHOD: GOLDMANN APPLANATION
OS_IOP_MMHG: 12

## 2025-01-09 ASSESSMENT — VISUAL ACUITY
OD_SC+: -2
OS_SC: 20/200
METHOD: SNELLEN - LINEAR
OD_SC: 20/25

## 2025-01-09 ASSESSMENT — SLIT LAMP EXAM - LIDS
COMMENTS: NORMAL
COMMENTS: NORMAL

## 2025-01-09 NOTE — PROGRESS NOTES
Visual Acuity (Snellen - Linear)         Right Left    Dist sc 20/25 -2 20/200          Tonometry       Tonometry (Goldmann Applanation, 10:29 AM)         Right Left    Pressure 13 12                  Assessment/Plan     Visual Acuity (Snellen - Linear)         Right Left    Dist sc 20/25 -2 20/200          Tonometry       Tonometry (Goldmann Applanation, 10:29 AM)         Right Left    Pressure 13 12                  Assessment/Plan   Last dilated:  1/9/25  last HVF: 3/28/24  Last gonio 1/9/25 OD:  D45r 2+ PTM  OS:  D45r 1+ PTM    1.   Primary Open-Angle Glaucoma OU:  /520 IOPs are acceptable given early stage of disease.  Pt had been using Xalatan in both eyes in spite of directions. s/p combined with KDB 12/12/23:  pre-op VA 20/40, IOP = 14 mmHg.  IOP is well controlled     Plan:  cont xalatan OU QHS               cont brimonidine 0.2% OU BID               F/u 3 month (HVF)    2.  Pseudophakia (PCIOL) OU:   Given epiretinal membrane (ERM), would not likely realize the benefit of the toric intraocular lens (IOL) and the astigmatism is against-the-rule astigmatism (ATR) where my incision is.  Pt chooses standard IOL.      Plan:  monitor    3.  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

## 2025-01-15 ENCOUNTER — HOSPITAL ENCOUNTER (OUTPATIENT)
Dept: RADIOLOGY | Facility: CLINIC | Age: OVER 89
Discharge: HOME | End: 2025-01-15
Payer: MEDICARE

## 2025-01-15 DIAGNOSIS — J18.9 PNEUMONIA OF LEFT LOWER LOBE DUE TO INFECTIOUS ORGANISM: ICD-10-CM

## 2025-01-15 PROCEDURE — 71046 X-RAY EXAM CHEST 2 VIEWS: CPT | Performed by: RADIOLOGY

## 2025-01-15 PROCEDURE — 71046 X-RAY EXAM CHEST 2 VIEWS: CPT

## 2025-05-01 ENCOUNTER — APPOINTMENT (OUTPATIENT)
Dept: OPHTHALMOLOGY | Facility: CLINIC | Age: OVER 89
End: 2025-05-01
Payer: MEDICARE

## 2025-05-01 DIAGNOSIS — Z96.1 PSEUDOPHAKIA OF BOTH EYES: ICD-10-CM

## 2025-05-01 DIAGNOSIS — H40.1132 PRIMARY OPEN ANGLE GLAUCOMA OF BOTH EYES, MODERATE STAGE: Primary | ICD-10-CM

## 2025-05-01 PROCEDURE — 92083 EXTENDED VISUAL FIELD XM: CPT | Performed by: OPHTHALMOLOGY

## 2025-05-01 PROCEDURE — 99213 OFFICE O/P EST LOW 20 MIN: CPT | Performed by: OPHTHALMOLOGY

## 2025-05-01 ASSESSMENT — VISUAL ACUITY
OS_SC: 20/200
OD_SC: 20/40
OD_PH_SC: 20/25
METHOD: SNELLEN - LINEAR
OD_PH_SC+: -1

## 2025-05-01 ASSESSMENT — PACHYMETRY
OS_CT(UM): 520
OD_CT(UM): 525

## 2025-05-01 ASSESSMENT — SLIT LAMP EXAM - LIDS
COMMENTS: NORMAL
COMMENTS: NORMAL

## 2025-05-01 ASSESSMENT — TONOMETRY
OS_IOP_MMHG: 13
IOP_METHOD: GOLDMANN APPLANATION
OD_IOP_MMHG: 12

## 2025-05-01 ASSESSMENT — ENCOUNTER SYMPTOMS: EYES NEGATIVE: 1

## 2025-05-01 ASSESSMENT — CUP TO DISC RATIO
OS_RATIO: 0.7
OD_RATIO: 0.7

## 2025-05-01 ASSESSMENT — EXTERNAL EXAM - LEFT EYE: OS_EXAM: NORMAL

## 2025-05-01 ASSESSMENT — EXTERNAL EXAM - RIGHT EYE: OD_EXAM: NORMAL

## 2025-05-01 NOTE — PROGRESS NOTES
Visual Acuity (Snellen - Linear)         Right Left    Dist sc 20/40 20/200    Dist ph sc 20/25 -1 NI          Tonometry       Tonometry (Goldmann Applanation, 8:24 AM)         Right Left    Pressure 12 13                  Assessment/Plan   Last dilated:  1/9/25  last HVF:  5/1/25  Last gonio 1/9/25 OD:  D45r 2+ PTM  OS:  D45r 1+ PTM    1.   Primary Open-Angle Glaucoma OU:  /520 IOPs are acceptable given early stage of disease.  Pt had been using Xalatan in both eyes in spite of directions. s/p combined with KDB 12/12/23:  pre-op VA 20/40, IOP = 14 mmHg.  IOP is well controlled     Plan:  cont xalatan OU QHS               cont brimonidine 0.2% OU BID               F/u 3 month     2.  Pseudophakia (PCIOL) OU:   Given epiretinal membrane (ERM), would not likely realize the benefit of the toric intraocular lens (IOL) and the astigmatism is against-the-rule astigmatism (ATR) where my incision is.  Pt chooses standard IOL.      Plan:  monitor    3.  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

## 2025-08-14 ENCOUNTER — APPOINTMENT (OUTPATIENT)
Dept: OPHTHALMOLOGY | Facility: CLINIC | Age: OVER 89
End: 2025-08-14
Payer: MEDICARE

## 2026-01-15 ENCOUNTER — APPOINTMENT (OUTPATIENT)
Dept: OPHTHALMOLOGY | Facility: CLINIC | Age: OVER 89
End: 2026-01-15
Payer: MEDICARE

## (undated) DEVICE — DRAPE, UTILITY, INSTRUMENT PANEL, 46CM X 56CM (18X22"

## (undated) DEVICE — DRAPE, SURGICAL, STERI DRAPE, INCISE, W/POUCH, MED, LF

## (undated) DEVICE — HANDPIECE,  IRRIGATION/ASPIRATION, 45DEG, 2.2MM-2.8MM, BLUE

## (undated) DEVICE — DELIVERY SYSTEM, IOL, MONARCH II, HANDPIECE, NS, REUSABLE

## (undated) DEVICE — SPEAR, EYE, SURGICAL, WECK-CEL, CELLULOSE

## (undated) DEVICE — NEEDLE, HYPODERMIC, REGULAR WALL, REGULAR BEVEL, 30 G X 0.5 IN

## (undated) DEVICE — GLOVE, SURGICAL, PROTEXIS NEOPRENE, 6.5, PF, LF

## (undated) DEVICE — NEEDLE, RETROBULBAR, 23G X 8MM

## (undated) DEVICE — Device

## (undated) DEVICE — BLADE, KAHOOK DUAL

## (undated) DEVICE — SYRINGE, 1 CC, LUER LOCK

## (undated) DEVICE — CANNULA, HYDRODISSECTION, FLATTENED, IRRIGATING

## (undated) DEVICE — CANNULA, 27G X 22MM, ANTERIOR, CHAMBER, RYCROFT

## (undated) DEVICE — CANNULA, VISTEC, ANTERIOR CHAMBER, RYCROFT, ANGLED, 30 G X 7/8 IN

## (undated) DEVICE — GOWN, ASTOUND, L

## (undated) DEVICE — SUTURE, VICRYL, 7-0, 18 IN, TG1608, DA, VIOLET

## (undated) DEVICE — CORD, BIPOLAR,  12 FT, DISPOSABLE, LF

## (undated) DEVICE — SYRINGE, TUBERCULIN, LUER SLIP, 1 ML, W/NEEDLE, PRECISIONGLIDE, INTRADERMAL BEVEL, REGULAR WALL, 27 G X 0.5 IN

## (undated) DEVICE — ERASER, HEMOSTATIC, WET-FIELD, BIPOLAR, 18 G

## (undated) DEVICE — SUTURE LIGATING LOOP W/SURGIGUT, 2-0, 21INCH